# Patient Record
Sex: MALE | Race: WHITE | NOT HISPANIC OR LATINO | Employment: OTHER | URBAN - METROPOLITAN AREA
[De-identification: names, ages, dates, MRNs, and addresses within clinical notes are randomized per-mention and may not be internally consistent; named-entity substitution may affect disease eponyms.]

---

## 2022-07-05 ENCOUNTER — OFFICE VISIT (OUTPATIENT)
Dept: FAMILY MEDICINE CLINIC | Facility: CLINIC | Age: 62
End: 2022-07-05

## 2022-07-05 VITALS
DIASTOLIC BLOOD PRESSURE: 82 MMHG | OXYGEN SATURATION: 97 % | TEMPERATURE: 97.1 F | WEIGHT: 164 LBS | BODY MASS INDEX: 25.74 KG/M2 | HEART RATE: 85 BPM | HEIGHT: 67 IN | SYSTOLIC BLOOD PRESSURE: 120 MMHG

## 2022-07-05 DIAGNOSIS — Z71.85 IMMUNIZATION COUNSELING: ICD-10-CM

## 2022-07-05 DIAGNOSIS — Z11.59 NEED FOR HEPATITIS C SCREENING TEST: ICD-10-CM

## 2022-07-05 DIAGNOSIS — G89.29 CHRONIC PAIN OF BOTH SHOULDERS: ICD-10-CM

## 2022-07-05 DIAGNOSIS — M06.9 RHEUMATOID ARTHRITIS INVOLVING BOTH HANDS, UNSPECIFIED WHETHER RHEUMATOID FACTOR PRESENT: ICD-10-CM

## 2022-07-05 DIAGNOSIS — R93.89 ABNORMAL CHEST X-RAY: Primary | ICD-10-CM

## 2022-07-05 DIAGNOSIS — M79.642 PAIN IN BOTH HANDS: ICD-10-CM

## 2022-07-05 DIAGNOSIS — M25.512 CHRONIC PAIN OF BOTH SHOULDERS: ICD-10-CM

## 2022-07-05 DIAGNOSIS — I42.9 IDIOPATHIC CARDIOMYOPATHY: Primary | ICD-10-CM

## 2022-07-05 DIAGNOSIS — M25.511 CHRONIC PAIN OF BOTH SHOULDERS: ICD-10-CM

## 2022-07-05 DIAGNOSIS — R07.81 RIB PAIN: ICD-10-CM

## 2022-07-05 DIAGNOSIS — E78.5 HYPERLIPIDEMIA, UNSPECIFIED HYPERLIPIDEMIA TYPE: ICD-10-CM

## 2022-07-05 DIAGNOSIS — J44.9 CHRONIC OBSTRUCTIVE PULMONARY DISEASE, UNSPECIFIED COPD TYPE: ICD-10-CM

## 2022-07-05 DIAGNOSIS — Z13.220 SCREENING CHOLESTEROL LEVEL: ICD-10-CM

## 2022-07-05 DIAGNOSIS — K40.90 LEFT INGUINAL HERNIA: ICD-10-CM

## 2022-07-05 DIAGNOSIS — M79.641 PAIN IN BOTH HANDS: ICD-10-CM

## 2022-07-05 PROBLEM — I42.0 NONISCHEMIC CONGESTIVE CARDIOMYOPATHY: Status: ACTIVE | Noted: 2022-07-05

## 2022-07-05 PROBLEM — J30.9 ALLERGIC RHINITIS: Status: ACTIVE | Noted: 2022-07-05

## 2022-07-05 PROBLEM — M41.9 SCOLIOSIS DEFORMITY OF SPINE: Status: ACTIVE | Noted: 2022-07-05

## 2022-07-05 PROCEDURE — 99204 OFFICE O/P NEW MOD 45 MIN: CPT | Performed by: NURSE PRACTITIONER

## 2022-07-05 RX ORDER — CARVEDILOL 12.5 MG/1
12.5 TABLET ORAL 2 TIMES DAILY
COMMUNITY
End: 2022-07-05 | Stop reason: SDUPTHER

## 2022-07-05 RX ORDER — ALBUTEROL SULFATE 90 UG/1
2 AEROSOL, METERED RESPIRATORY (INHALATION) EVERY 4 HOURS PRN
COMMUNITY
End: 2022-07-05 | Stop reason: SDUPTHER

## 2022-07-05 RX ORDER — LISINOPRIL 40 MG/1
40 TABLET ORAL DAILY
Qty: 90 TABLET | Refills: 0 | Status: SHIPPED | OUTPATIENT
Start: 2022-07-05 | End: 2022-10-04 | Stop reason: SDUPTHER

## 2022-07-05 RX ORDER — ALBUTEROL SULFATE 90 UG/1
2 AEROSOL, METERED RESPIRATORY (INHALATION) EVERY 4 HOURS PRN
Qty: 18 G | Refills: 1 | Status: SHIPPED | OUTPATIENT
Start: 2022-07-05

## 2022-07-05 RX ORDER — LISINOPRIL 40 MG/1
40 TABLET ORAL DAILY
COMMUNITY
End: 2022-07-05 | Stop reason: SDUPTHER

## 2022-07-05 RX ORDER — AZITHROMYCIN 250 MG/1
TABLET, FILM COATED ORAL
Qty: 6 TABLET | Refills: 0 | Status: SHIPPED | OUTPATIENT
Start: 2022-07-05 | End: 2022-08-16

## 2022-07-05 RX ORDER — CHLORPHENIRAMINE MALEATE 4 MG/1
8 TABLET ORAL DAILY
COMMUNITY

## 2022-07-05 RX ORDER — CARVEDILOL 12.5 MG/1
12.5 TABLET ORAL 2 TIMES DAILY
Qty: 180 TABLET | Refills: 0 | Status: SHIPPED | OUTPATIENT
Start: 2022-07-05 | End: 2022-10-04 | Stop reason: SDUPTHER

## 2022-07-05 NOTE — PROGRESS NOTES
Chief Complaint  Hypertension (Refills)    PHQ-2 Total Score: 0    Subjective        Past Medical History:   Diagnosis Date   • Cardiomyopathy (HCC)    • Hypertension    • Renal anomaly      Social History     Tobacco Use   • Smoking status: Former Smoker     Packs/day: 1.00     Quit date:      Years since quittin.5   • Smokeless tobacco: Never Used   Vaping Use   • Vaping Use: Never used   Substance Use Topics   • Alcohol use: Yes     Comment: occasionally   • Drug use: Never      Current Outpatient Medications on File Prior to Visit   Medication Sig   • chlorpheniramine (Allergy Relief) 4 MG tablet Take 8 mg by mouth Daily.   • [DISCONTINUED] albuterol sulfate  (90 Base) MCG/ACT inhaler Inhale 2 puffs Every 4 (Four) Hours As Needed for Wheezing.   • [DISCONTINUED] carvedilol (COREG) 12.5 MG tablet Take 12.5 mg by mouth 2 (Two) Times a Day.   • [DISCONTINUED] lisinopril (PRINIVIL,ZESTRIL) 40 MG tablet Take 40 mg by mouth Daily.     No current facility-administered medications on file prior to visit.      Allergies   Allergen Reactions   • Penicillins Unknown - Low Severity      Health Maintenance Due   Topic Date Due   • COLORECTAL CANCER SCREENING  Never done   • ANNUAL PHYSICAL  Never done   • TDAP/TD VACCINES (1 - Tdap) Never done   • ZOSTER VACCINE (1 of 2) Never done   • COVID-19 Vaccine (4 - Booster for Pfizer series) 2022   • HEPATITIS C SCREENING  Never done   • LIPID PANEL  Never done      Harjinder Schneider presents to [facility]  Here as a new pt to establish care and obtain refills.     Idiopathic Cardiomyopathy: pt states his BP is usually normal. Denies lightheadedness, dizziness, CP, palpitations.     Pt had left inguinal hernia surgery on 2021. States that it is still there.     Rheumatoid arthritis: Notes especially in the hands with hx of joint replacements in the hands. Would like to follow up with Ortho, declines Rheumatology referral at  "this time.     Bilateral shoulder pain: pt states he has hx of torn rotator cuff and bone spurs bilaterally.     Bilateral rib pain with occasional sharp stabbing pain of the right middle ribs. Hx of pneumonia. Hx of coughing. Hx of fractured ribs without known injury.    Colonoscopy done about 4 years ago in [place], [place] at an outpatient surgical center.     No recent Tdap.     Pt has not received Shingles vaccine.       Objective   Vital Signs:   /82 (BP Location: Right arm)   Pulse 85   Temp 97.1 °F (36.2 °C)   Ht 168.9 cm (66.5\")   Wt 74.4 kg (164 lb)   SpO2 97%   BMI 26.07 kg/m²     Review of Systems   Physical Exam  Vitals reviewed.   Constitutional:       General: He is not in acute distress.     Appearance: Normal appearance. He is well-developed.   HENT:      Head: Normocephalic and atraumatic.   Eyes:      Conjunctiva/sclera: Conjunctivae normal.      Pupils: Pupils are equal, round, and reactive to light.   Cardiovascular:      Rate and Rhythm: Normal rate and regular rhythm.      Heart sounds: Normal heart sounds.   Pulmonary:      Effort: Pulmonary effort is normal.      Breath sounds: Normal breath sounds.   Musculoskeletal:      Cervical back: Neck supple.        Back:       Right lower leg: No edema.      Left lower leg: No edema.      Comments: Enlarged joints of bilateral hands.    Skin:     General: Skin is warm and dry.   Neurological:      Mental Status: He is alert and oriented to person, place, and time.   Psychiatric:         Mood and Affect: Mood and affect normal.         Behavior: Behavior normal.         Thought Content: Thought content normal.         Judgment: Judgment normal.        Result Review :                 Assessment and Plan    Diagnoses and all orders for this visit:    1. Idiopathic cardiomyopathy (HCC) (Primary)  -     Ambulatory Referral to Cardiology  -     CBC & Differential  -     Comprehensive Metabolic Panel  -     Lipid Panel  -     Urinalysis With " Culture If Indicated -  -     carvedilol (COREG) 12.5 MG tablet; Take 1 tablet by mouth 2 (Two) Times a Day.  Dispense: 180 tablet; Refill: 0  -     lisinopril (PRINIVIL,ZESTRIL) 40 MG tablet; Take 1 tablet by mouth Daily.  Dispense: 90 tablet; Refill: 0    2. Chronic obstructive pulmonary disease, unspecified COPD type (HCC)  -     albuterol sulfate  (90 Base) MCG/ACT inhaler; Inhale 2 puffs Every 4 (Four) Hours As Needed for Wheezing.  Dispense: 18 g; Refill: 1    3. Screening cholesterol level  -     Lipid Panel    4. Rheumatoid arthritis involving both hands, unspecified whether rheumatoid factor present (HCC)  -     Ambulatory Referral to Orthopedic Surgery  -     RHEUMATOID FACTOR    5. Left inguinal hernia  -     Ambulatory Referral to General Surgery    6. Pain in both hands    7. Chronic pain of both shoulders  -     Ambulatory Referral to Orthopedic Surgery    8. Hyperlipidemia, unspecified hyperlipidemia type  -     Lipid Panel    9. Rib pain  -     XR Chest PA & Lateral (In Office)    10. Immunization counseling  Comments:  Shefali Covington    11. Need for hepatitis C screening test  -     Hepatitis C Antibody        Follow Up   Return in about 3 months (around 10/5/2022) for Refills and fasting labs.  Patient was given instructions and counseling regarding his condition or for health maintenance advice. Please see specific information pulled into the AVS if appropriate.

## 2022-07-06 ENCOUNTER — CLINICAL SUPPORT (OUTPATIENT)
Dept: FAMILY MEDICINE CLINIC | Facility: CLINIC | Age: 62
End: 2022-07-06

## 2022-07-06 DIAGNOSIS — J30.9 ALLERGIC RHINITIS, UNSPECIFIED SEASONALITY, UNSPECIFIED TRIGGER: ICD-10-CM

## 2022-07-06 LAB
ALBUMIN SERPL-MCNC: 4.1 G/DL (ref 3.5–5.2)
ALBUMIN/GLOB SERPL: 2 G/DL
ALP SERPL-CCNC: 70 U/L (ref 39–117)
ALT SERPL W P-5'-P-CCNC: 10 U/L (ref 1–41)
ANION GAP SERPL CALCULATED.3IONS-SCNC: 8.2 MMOL/L (ref 5–15)
AST SERPL-CCNC: 5 U/L (ref 1–40)
BASOPHILS # BLD AUTO: 0.07 10*3/MM3 (ref 0–0.2)
BASOPHILS NFR BLD AUTO: 0.5 % (ref 0–1.5)
BILIRUB SERPL-MCNC: 0.3 MG/DL (ref 0–1.2)
BILIRUB UR QL STRIP: NEGATIVE
BUN SERPL-MCNC: 22 MG/DL (ref 8–23)
BUN/CREAT SERPL: 22 (ref 7–25)
CALCIUM SPEC-SCNC: 9 MG/DL (ref 8.6–10.5)
CHLORIDE SERPL-SCNC: 103 MMOL/L (ref 98–107)
CHOLEST SERPL-MCNC: 143 MG/DL (ref 0–200)
CHROMATIN AB SERPL-ACNC: 14.3 IU/ML (ref 0–14)
CLARITY UR: CLEAR
CO2 SERPL-SCNC: 25.8 MMOL/L (ref 22–29)
COLOR UR: YELLOW
CREAT SERPL-MCNC: 1 MG/DL (ref 0.76–1.27)
DEPRECATED RDW RBC AUTO: 46.3 FL (ref 37–54)
EGFRCR SERPLBLD CKD-EPI 2021: 85.6 ML/MIN/1.73
EOSINOPHIL # BLD AUTO: 0 10*3/MM3 (ref 0–0.4)
EOSINOPHIL NFR BLD AUTO: 0 % (ref 0.3–6.2)
ERYTHROCYTE [DISTWIDTH] IN BLOOD BY AUTOMATED COUNT: 13.1 % (ref 12.3–15.4)
GLOBULIN UR ELPH-MCNC: 2.1 GM/DL
GLUCOSE SERPL-MCNC: 91 MG/DL (ref 65–99)
GLUCOSE UR STRIP-MCNC: NEGATIVE MG/DL
HCT VFR BLD AUTO: 44.9 % (ref 37.5–51)
HCV AB SER DONR QL: NORMAL
HDLC SERPL-MCNC: 52 MG/DL (ref 40–60)
HGB BLD-MCNC: 15 G/DL (ref 13–17.7)
HGB UR QL STRIP.AUTO: NEGATIVE
IMM GRANULOCYTES # BLD AUTO: 0.23 10*3/MM3 (ref 0–0.05)
IMM GRANULOCYTES NFR BLD AUTO: 1.7 % (ref 0–0.5)
KETONES UR QL STRIP: NEGATIVE
LDLC SERPL CALC-MCNC: 79 MG/DL (ref 0–100)
LDLC/HDLC SERPL: 1.54 {RATIO}
LEUKOCYTE ESTERASE UR QL STRIP.AUTO: NEGATIVE
LYMPHOCYTES # BLD AUTO: 2.7 10*3/MM3 (ref 0.7–3.1)
LYMPHOCYTES NFR BLD AUTO: 20.1 % (ref 19.6–45.3)
MCH RBC QN AUTO: 32.1 PG (ref 26.6–33)
MCHC RBC AUTO-ENTMCNC: 33.4 G/DL (ref 31.5–35.7)
MCV RBC AUTO: 95.9 FL (ref 79–97)
MONOCYTES # BLD AUTO: 0.68 10*3/MM3 (ref 0.1–0.9)
MONOCYTES NFR BLD AUTO: 5.1 % (ref 5–12)
NEUTROPHILS NFR BLD AUTO: 72.6 % (ref 42.7–76)
NEUTROPHILS NFR BLD AUTO: 9.72 10*3/MM3 (ref 1.7–7)
NITRITE UR QL STRIP: NEGATIVE
NRBC BLD AUTO-RTO: 0 /100 WBC (ref 0–0.2)
PH UR STRIP.AUTO: 6 [PH] (ref 5–8)
PLATELET # BLD AUTO: 230 10*3/MM3 (ref 140–450)
PMV BLD AUTO: 10.4 FL (ref 6–12)
POTASSIUM SERPL-SCNC: 4.7 MMOL/L (ref 3.5–5.2)
PROT SERPL-MCNC: 6.2 G/DL (ref 6–8.5)
PROT UR QL STRIP: NEGATIVE
RBC # BLD AUTO: 4.68 10*6/MM3 (ref 4.14–5.8)
SODIUM SERPL-SCNC: 137 MMOL/L (ref 136–145)
SP GR UR STRIP: 1.02 (ref 1–1.03)
TRIGL SERPL-MCNC: 54 MG/DL (ref 0–150)
UROBILINOGEN UR QL STRIP: NORMAL
VLDLC SERPL-MCNC: 12 MG/DL (ref 5–40)
WBC NRBC COR # BLD: 13.4 10*3/MM3 (ref 3.4–10.8)

## 2022-07-06 PROCEDURE — 80053 COMPREHEN METABOLIC PANEL: CPT | Performed by: NURSE PRACTITIONER

## 2022-07-06 PROCEDURE — 85025 COMPLETE CBC W/AUTO DIFF WBC: CPT | Performed by: NURSE PRACTITIONER

## 2022-07-06 PROCEDURE — 81003 URINALYSIS AUTO W/O SCOPE: CPT | Performed by: NURSE PRACTITIONER

## 2022-07-06 PROCEDURE — 86803 HEPATITIS C AB TEST: CPT | Performed by: NURSE PRACTITIONER

## 2022-07-06 PROCEDURE — 36415 COLL VENOUS BLD VENIPUNCTURE: CPT | Performed by: NURSE PRACTITIONER

## 2022-07-06 PROCEDURE — 86431 RHEUMATOID FACTOR QUANT: CPT | Performed by: NURSE PRACTITIONER

## 2022-07-06 PROCEDURE — 80061 LIPID PANEL: CPT | Performed by: NURSE PRACTITIONER

## 2022-07-06 NOTE — PROGRESS NOTES
Venipuncture Blood Specimen Collection  Venipuncture performed in left arm  by Brinda Velez with good hemostasis. Patient tolerated the procedure well without complications.   07/06/22   Brinda Velez

## 2022-07-19 ENCOUNTER — OFFICE VISIT (OUTPATIENT)
Dept: FAMILY MEDICINE CLINIC | Facility: CLINIC | Age: 62
End: 2022-07-19

## 2022-07-19 VITALS
WEIGHT: 164 LBS | DIASTOLIC BLOOD PRESSURE: 82 MMHG | OXYGEN SATURATION: 98 % | BODY MASS INDEX: 25.74 KG/M2 | HEIGHT: 67 IN | HEART RATE: 71 BPM | TEMPERATURE: 97.7 F | SYSTOLIC BLOOD PRESSURE: 120 MMHG

## 2022-07-19 DIAGNOSIS — R93.89 ABNORMAL CHEST X-RAY: ICD-10-CM

## 2022-07-19 DIAGNOSIS — D72.829 LEUKOCYTOSIS, UNSPECIFIED TYPE: Primary | ICD-10-CM

## 2022-07-19 LAB
BASOPHILS # BLD AUTO: 0.04 10*3/MM3 (ref 0–0.2)
BASOPHILS NFR BLD AUTO: 0.4 % (ref 0–1.5)
DEPRECATED RDW RBC AUTO: 45.9 FL (ref 37–54)
EOSINOPHIL # BLD AUTO: 0 10*3/MM3 (ref 0–0.4)
EOSINOPHIL NFR BLD AUTO: 0 % (ref 0.3–6.2)
ERYTHROCYTE [DISTWIDTH] IN BLOOD BY AUTOMATED COUNT: 13.1 % (ref 12.3–15.4)
HCT VFR BLD AUTO: 43 % (ref 37.5–51)
HGB BLD-MCNC: 14.4 G/DL (ref 13–17.7)
IMM GRANULOCYTES # BLD AUTO: 0.09 10*3/MM3 (ref 0–0.05)
IMM GRANULOCYTES NFR BLD AUTO: 0.9 % (ref 0–0.5)
LYMPHOCYTES # BLD AUTO: 2.2 10*3/MM3 (ref 0.7–3.1)
LYMPHOCYTES NFR BLD AUTO: 21.3 % (ref 19.6–45.3)
MCH RBC QN AUTO: 32 PG (ref 26.6–33)
MCHC RBC AUTO-ENTMCNC: 33.5 G/DL (ref 31.5–35.7)
MCV RBC AUTO: 95.6 FL (ref 79–97)
MONOCYTES # BLD AUTO: 0.55 10*3/MM3 (ref 0.1–0.9)
MONOCYTES NFR BLD AUTO: 5.3 % (ref 5–12)
NEUTROPHILS NFR BLD AUTO: 7.43 10*3/MM3 (ref 1.7–7)
NEUTROPHILS NFR BLD AUTO: 72.1 % (ref 42.7–76)
NRBC BLD AUTO-RTO: 0 /100 WBC (ref 0–0.2)
PLATELET # BLD AUTO: 221 10*3/MM3 (ref 140–450)
PMV BLD AUTO: 10.4 FL (ref 6–12)
RBC # BLD AUTO: 4.5 10*6/MM3 (ref 4.14–5.8)
WBC NRBC COR # BLD: 10.31 10*3/MM3 (ref 3.4–10.8)

## 2022-07-19 PROCEDURE — 85025 COMPLETE CBC W/AUTO DIFF WBC: CPT | Performed by: NURSE PRACTITIONER

## 2022-07-19 PROCEDURE — 99213 OFFICE O/P EST LOW 20 MIN: CPT | Performed by: NURSE PRACTITIONER

## 2022-07-19 RX ORDER — ASPIRIN 81 MG/1
81 TABLET ORAL DAILY
Status: ON HOLD | COMMUNITY
End: 2022-12-01

## 2022-07-19 NOTE — PROGRESS NOTES
Venipuncture Blood Specimen Collection  Venipuncture performed in left arm by Zenobia Collins with good hemostasis. Patient tolerated the procedure well without complications.   07/19/22   Zenobia Collins

## 2022-07-19 NOTE — PROGRESS NOTES
Chief Complaint  Abnormal Chest X-ray (Follow up to chest xray and labs)    Subjective        Past Medical History:   Diagnosis Date   • Cardiomyopathy (HCC)    • Hypertension    • Renal anomaly      Social History     Tobacco Use   • Smoking status: Former Smoker     Packs/day: 1.00     Quit date:      Years since quittin.5   • Smokeless tobacco: Never Used   Vaping Use   • Vaping Use: Never used   Substance Use Topics   • Alcohol use: Yes     Comment: occasionally   • Drug use: Never      Current Outpatient Medications on File Prior to Visit   Medication Sig   • albuterol sulfate  (90 Base) MCG/ACT inhaler Inhale 2 puffs Every 4 (Four) Hours As Needed for Wheezing.   • aspirin 81 MG EC tablet Take 81 mg by mouth Daily.   • carvedilol (COREG) 12.5 MG tablet Take 1 tablet by mouth 2 (Two) Times a Day.   • chlorpheniramine (CHLOR-TRIMETON) 4 MG tablet Take 8 mg by mouth Daily.   • lisinopril (PRINIVIL,ZESTRIL) 40 MG tablet Take 1 tablet by mouth Daily.   • azithromycin (Zithromax Z-Michael) 250 MG tablet Take 2 tablets by mouth on day 1, then 1 tablet daily on days 2-5     No current facility-administered medications on file prior to visit.      Allergies   Allergen Reactions   • Penicillins Unknown - Low Severity      Health Maintenance Due   Topic Date Due   • COLORECTAL CANCER SCREENING  Never done   • Pneumococcal Vaccine 0-64 (1 - PCV) Never done   • TDAP/TD VACCINES (1 - Tdap) Never done   • ZOSTER VACCINE (1 of 2) Never done   • COVID-19 Vaccine (4 - Booster for Pfizer series) 2022   • ANNUAL WELLNESS VISIT  Never done      Harjinder Schneider presents to [facility]  Here to follow up on abnormal x-ray that showed possible developing left lower lobe pneumonia. Pts CBC also showed elevated WBC.     Pt has an appointment tomorrow with general surgeon for hernia consult, left inguinal hernia. Pt states he will feel air and gas through the area. He can feel it pushing  "back in when he leans over something.     Pt had elevated RF but declines referral to Rheumatology.     Pt notes worsening shoulder pain, awaiting referral to Ortho. States it is causing difficulty sleeping.       Objective   Vital Signs:   /82 (BP Location: Left arm)   Pulse 71   Temp 97.7 °F (36.5 °C)   Ht 168.9 cm (66.5\")   Wt 74.4 kg (164 lb)   SpO2 98%   BMI 26.07 kg/m²     Review of Systems   Physical Exam  Vitals reviewed.   Constitutional:       General: He is not in acute distress.     Appearance: Normal appearance. He is well-developed.   Eyes:      Conjunctiva/sclera: Conjunctivae normal.      Pupils: Pupils are equal, round, and reactive to light.   Cardiovascular:      Rate and Rhythm: Normal rate and regular rhythm.      Heart sounds: Normal heart sounds.   Pulmonary:      Effort: Pulmonary effort is normal.      Breath sounds: Normal breath sounds.   Musculoskeletal:      Cervical back: Neck supple.   Skin:     General: Skin is warm and dry.   Neurological:      Mental Status: He is alert and oriented to person, place, and time.   Psychiatric:         Mood and Affect: Mood and affect normal.         Behavior: Behavior normal.         Thought Content: Thought content normal.         Judgment: Judgment normal.        Result Review :   The following data was reviewed by: TROY Richard on 07/19/2022:  Common labs    Common Labsle 7/6/22 7/6/22 7/6/22    1023 1023 1023   Glucose  91    BUN  22    Creatinine  1.00    Sodium  137    Potassium  4.7    Chloride  103    Calcium  9.0    Albumin  4.10    Total Bilirubin  0.3    Alkaline Phosphatase  70    AST (SGOT)  5    ALT (SGPT)  10    WBC 13.40 (A)     Hemoglobin 15.0     Hematocrit 44.9     Platelets 230     Total Cholesterol   143   Triglycerides   54   HDL Cholesterol   52   LDL Cholesterol    79   (A) Abnormal value            Data reviewed: Radiologic studies CXR          Assessment and Plan    Diagnoses and all orders for this " visit:    1. Leukocytosis, unspecified type (Primary)  -     CBC & Differential    2. Abnormal chest x-ray  -     XR Chest PA & Lateral (In Office)        Follow Up   Return if symptoms worsen or fail to improve.  Patient was given instructions and counseling regarding his condition or for health maintenance advice. Please see specific information pulled into the AVS if appropriate.

## 2022-07-20 ENCOUNTER — OFFICE VISIT (OUTPATIENT)
Dept: SURGERY | Facility: CLINIC | Age: 62
End: 2022-07-20

## 2022-07-20 ENCOUNTER — PREP FOR SURGERY (OUTPATIENT)
Dept: OTHER | Facility: HOSPITAL | Age: 62
End: 2022-07-20

## 2022-07-20 VITALS — HEIGHT: 66 IN | WEIGHT: 164 LBS | BODY MASS INDEX: 26.36 KG/M2 | RESPIRATION RATE: 16 BRPM

## 2022-07-20 DIAGNOSIS — K40.90 LEFT INGUINAL HERNIA: Primary | ICD-10-CM

## 2022-07-20 PROCEDURE — 99204 OFFICE O/P NEW MOD 45 MIN: CPT | Performed by: SURGERY

## 2022-07-20 RX ORDER — CEFAZOLIN SODIUM 2 G/100ML
2 INJECTION, SOLUTION INTRAVENOUS ONCE
Status: CANCELLED | OUTPATIENT
Start: 2022-07-20 | End: 2022-07-20

## 2022-07-20 RX ORDER — SODIUM CHLORIDE 0.9 % (FLUSH) 0.9 %
10 SYRINGE (ML) INJECTION EVERY 12 HOURS SCHEDULED
Status: CANCELLED | OUTPATIENT
Start: 2022-07-20

## 2022-07-20 RX ORDER — SODIUM CHLORIDE 9 MG/ML
40 INJECTION, SOLUTION INTRAVENOUS AS NEEDED
Status: CANCELLED | OUTPATIENT
Start: 2022-07-20

## 2022-07-20 RX ORDER — SODIUM CHLORIDE, SODIUM LACTATE, POTASSIUM CHLORIDE, CALCIUM CHLORIDE 600; 310; 30; 20 MG/100ML; MG/100ML; MG/100ML; MG/100ML
100 INJECTION, SOLUTION INTRAVENOUS CONTINUOUS
Status: CANCELLED | OUTPATIENT
Start: 2022-07-20

## 2022-07-20 RX ORDER — SODIUM CHLORIDE 0.9 % (FLUSH) 0.9 %
10 SYRINGE (ML) INJECTION AS NEEDED
Status: CANCELLED | OUTPATIENT
Start: 2022-07-20

## 2022-07-20 NOTE — PROGRESS NOTES
"Chief Complaint: Hernia (Left inguinal )    Subjective       History of Present Illness  Harjinder Schneider is a 61 y.o. male presents to [facility] to be seen  for an inguinal hernia.    Harjidner Schneider, an adult male, is a new patient and is being seen for an inguinal hernia. He is accompanied by an adult female.    Left inguinal hernia  The patient reports that he has a hernia on his left groin that has been present for 56 years. He states that when he was 6 years old, he had a double inguinal hernia that was repaired until last year. He reports that it is just the left side, and the right is okay. The left is not ok because it was smashed. He reports that it feels like there are \"old wire pieces in there\". He states that he has been awakened in the night at different times feeling like someone stabbed him in the abdomen. He reports that he previously got the flu, and states when he would cough, it felt like something popping back out again. He reports that he had an open hernia repair last year. He states they put in a piece of mesh to try and fix it. He reports that it lasted approximately 6 months. When he blows his nose, it feels like a knife coming from the inside. He states he has a lot of tenderness in his abdomen. He reports that he has not had a problem having a bowel movement. He states since he had this surgery, he feels like he has to urinate, but he cannot get up until he gets unpinched. He reports that a lot of times it feels like he has to go over to the neighbor's house first and then come back. He states that sometimes he will sit on the toilet for 10 minutes before he is finally able to urinate. He reports he only has one kidney that works. He states that the one on the left side does not work. He reports that he also has thin skin as well. The adult female states that the patient's mother has had several hernia repairs. She had the robotic surgery once and it nicked an " "artery. The patient states that he does not think doing another CT scan is necessary. The adult female reports the patient goes to see his cardiologist next month.    Objective     Past Medical History:   Diagnosis Date   • Cardiomyopathy (HCC)    • Hypertension    • Renal anomaly        Past Surgical History:   Procedure Laterality Date   • HERNIA REPAIR     • ROTATOR CUFF REPAIR Bilateral          Current Outpatient Medications:   •  albuterol sulfate  (90 Base) MCG/ACT inhaler, Inhale 2 puffs Every 4 (Four) Hours As Needed for Wheezing., Disp: 18 g, Rfl: 1  •  aspirin 81 MG EC tablet, Take 81 mg by mouth Daily., Disp: , Rfl:   •  carvedilol (COREG) 12.5 MG tablet, Take 1 tablet by mouth 2 (Two) Times a Day., Disp: 180 tablet, Rfl: 0  •  chlorpheniramine (CHLOR-TRIMETON) 4 MG tablet, Take 8 mg by mouth Daily., Disp: , Rfl:   •  lisinopril (PRINIVIL,ZESTRIL) 40 MG tablet, Take 1 tablet by mouth Daily., Disp: 90 tablet, Rfl: 0  •  azithromycin (Zithromax Z-Michael) 250 MG tablet, Take 2 tablets by mouth on day 1, then 1 tablet daily on days 2-5, Disp: 6 tablet, Rfl: 0    Allergies   Allergen Reactions   • Penicillins Unknown - Low Severity        Family History   Problem Relation Age of Onset   • Arthritis Mother    • Thyroid disease Mother    • Stroke Mother    • Hypertension Mother    • Heart disease Father    • Crohn's disease Father        Social History     Socioeconomic History   • Marital status:    Tobacco Use   • Smoking status: Former Smoker     Packs/day: 1.00     Quit date:      Years since quittin.5   • Smokeless tobacco: Never Used   Vaping Use   • Vaping Use: Never used   Substance and Sexual Activity   • Alcohol use: Yes     Comment: occasionally   • Drug use: Never       Vital Signs:   Resp 16   Ht 167.6 cm (66\")   Wt 74.4 kg (164 lb)   BMI 26.47 kg/m²      Physical Exam  Constitutional:       Appearance: Normal appearance. He is well-developed and normal weight.      Comments: " No acute distress.    Cardiovascular:      Heart sounds: No murmur heard.  Pulmonary:      Effort: Pulmonary effort is normal.      Breath sounds: Normal air entry.      Comments: No acute distress.   Abdominal:      Comments: Abdomen is soft. He has a clear partial reducible hernia on the left groin. He is tender there. There are no overlying skin changes, but it is enlarging by his estimation.   Neurological:      Mental Status: He is alert and oriented to person, place, and time.      Motor: Motor function is intact.          Result Review :         Procedures        Assessment and Plan   There are no diagnoses linked to this encounter.    Assessment and Plan   Diagnoses and all orders for this visit:      1. The patient has a symptomatic recurrent left inguinal hernia. He had it repaired previously somewhere in [place].     The patient will have robotic inguinal hernia repair. The risks, benefits, and alternatives of the procedure were discussed extensively. All questions were answered. The patient voiced understanding and agreed to proceed with the above plan.      Follow Up   No follow-ups on file.  Patient was given instructions and counseling regarding his condition or for health maintenance advice. Please see specific information pulled into the AVS if appropriate.     Discussed with the patient - all questions were answered they voiced understanding and agreed to proceed with above plan       Transcribed from ambient dictation for Josh Aguilar MD by Yumi Moran.  07/20/22   15:05 EDT    Patient verbalized consent to the visit recording.

## 2022-07-25 ENCOUNTER — TELEPHONE (OUTPATIENT)
Dept: ORTHOPEDIC SURGERY | Facility: CLINIC | Age: 62
End: 2022-07-25

## 2022-07-25 NOTE — TELEPHONE ENCOUNTER
CHRONIC BILATERAL SHOULDER PAIN, HE STATES HE HAD RTC SURGERY APPROXIMATELY 6 YEARS AGO ON BOTH SHOULDERS, BUT UNABLE TO OBTAIN RECORDS.

## 2022-08-05 ENCOUNTER — OFFICE VISIT (OUTPATIENT)
Dept: ORTHOPEDIC SURGERY | Facility: CLINIC | Age: 62
End: 2022-08-05

## 2022-08-05 VITALS — HEART RATE: 70 BPM | BODY MASS INDEX: 26.36 KG/M2 | OXYGEN SATURATION: 98 % | HEIGHT: 66 IN | WEIGHT: 164 LBS

## 2022-08-05 DIAGNOSIS — M25.511 BILATERAL SHOULDER PAIN, UNSPECIFIED CHRONICITY: Primary | ICD-10-CM

## 2022-08-05 DIAGNOSIS — M25.512 BILATERAL SHOULDER PAIN, UNSPECIFIED CHRONICITY: Primary | ICD-10-CM

## 2022-08-05 DIAGNOSIS — M12.811 ROTATOR CUFF ARTHROPATHY OF BOTH SHOULDERS: ICD-10-CM

## 2022-08-05 DIAGNOSIS — M12.812 ROTATOR CUFF ARTHROPATHY OF BOTH SHOULDERS: ICD-10-CM

## 2022-08-05 PROCEDURE — 99203 OFFICE O/P NEW LOW 30 MIN: CPT | Performed by: STUDENT IN AN ORGANIZED HEALTH CARE EDUCATION/TRAINING PROGRAM

## 2022-08-05 NOTE — PROGRESS NOTES
"Chief Complaint  Pain of the Right Shoulder and Pain of the Left Shoulder    Subjective          Harjinder Schneider presents to [facility] for   History of Present Illness    Harjinder presents today for evaluation of his bilateral shoulders.  He has a greater than 10-year history of bilateral shoulder pain.  He is a rheumatoid patient.  He has had bilateral shoulder surgery in the past, approximately 8 years ago.  He had attempted rotator cuff repairs done bilaterally.  He reports mild to moderate improvement after the shoulder surgeries.  His pain is now returning.  He has pain at night.  He has pain at rest.  He has pain with activity.  He has pain with reaching and lifting.  Shoulders feel weak bilaterally.  He denies any radiating pain or numbness.  The pain localizes to the lateral shoulders.  He is right-hand dominant.  His shoulders are equal in severity subjectively.    Allergies   Allergen Reactions   • Penicillins Unknown - Low Severity        Social History     Socioeconomic History   • Marital status:    Tobacco Use   • Smoking status: Former Smoker     Packs/day: 1.00     Quit date:      Years since quittin.6   • Smokeless tobacco: Never Used   Vaping Use   • Vaping Use: Never used   Substance and Sexual Activity   • Alcohol use: Yes     Comment: occasionally   • Drug use: Never        I reviewed the patient's chief complaint, history of present illness, review of systems, past medical history, surgical history, family history, social history, medications, and allergy list.     REVIEW OF SYSTEMS    Constitutional: Denies fevers, chills, weight loss  Cardiovascular: Denies chest pain, shortness of breath  Skin: Denies rashes, acute skin changes  Neurologic: Denies headache, loss of consciousness  MSK: Bilateral shoulder pain      Objective   Vital Signs:   Pulse 70   Ht 167.6 cm (66\")   Wt 74.4 kg (164 lb)   SpO2 98%   BMI 26.47 kg/m²     Body mass index is 26.47 " kg/m².    Physical Exam    General: Alert. No acute distress.   Right upper extremity: No areas of tenderness.  Active elevation 180 degrees, external rotation to 45 degrees, internal rotation to the lower lumbar spine.  4- out of 5 with supraspinatus and infraspinatus testing.  5 out of 5 subscapularis testing.  No mechanical symptoms with motion.  Pain with speeds testing.  No pain with crossarm adduction.  Distal neurovascular intact.    Left upper extremity: No areas of tenderness.  Active elevation 180 degrees.  External rotation 45 degrees.  Internal rotation of the lower lumbar spine.  4-5 with supraspinatus and infraspinatus testing.  5 out of 5 subscapularis testing.  Slight crepitus with motion.  Pain with speeds testing.  No pain with crossarm adduction.  Distal neurovascular intact.    Procedures    Imaging Results (Most Recent)     Procedure Component Value Units Date/Time    XR Shoulder 2+ View Left [[id number]] Resulted: 08/05/22 0953     Updated: 08/05/22 0954    Narrative:      Indications: Left shoulder pain    Views: AP, Grashey, Scap Y, axillary left shoulder    Findings: Arthritic changes are seen in the glenohumeral joint.  The   humeral head is slightly high riding.  Mild AC joint arthritic changes   noted.  There appears to be cystic changes within the glenoid.  An   inferior osteophyte is present on the humeral head.  Glenohumeral joint   reduced.    Comparative Data: No comparative data available      XR Shoulder 2+ View Right [[id number]] Resulted: 08/05/22 0952     Updated: 08/05/22 0953    Narrative:      Indications: Right shoulder pain    Views: AP, Grashey, Scap Y, axillary right shoulder    Findings: No fractures noted.  Glenohumeral joint reduced.  Moderate AC   joint arthritis.  No glenohumeral arthritic changes appreciated.  The   humeral head may be slightly high riding on the AP view.    Comparative Data: No comparative data available                     Assessment and Plan         XR Shoulder 2+ View Left    Result Date: 8/5/2022  Narrative: Indications: Left shoulder pain Views: AP, Grashey, Scap Y, axillary left shoulder Findings: Arthritic changes are seen in the glenohumeral joint.  The humeral head is slightly high riding.  Mild AC joint arthritic changes noted.  There appears to be cystic changes within the glenoid.  An inferior osteophyte is present on the humeral head.  Glenohumeral joint reduced. Comparative Data: No comparative data available     XR Shoulder 2+ View Right    Result Date: 8/5/2022  Narrative: Indications: Right shoulder pain Views: AP, Grashey, Scap Y, axillary right shoulder Findings: No fractures noted.  Glenohumeral joint reduced.  Moderate AC joint arthritis.  No glenohumeral arthritic changes appreciated.  The humeral head may be slightly high riding on the AP view. Comparative Data: No comparative data available     XR Chest PA & Lateral (In Office)    Result Date: 7/19/2022  Narrative: PROCEDURE: XR CHEST PA AND LATERAL  COMPARISON: [facility] , XR CHEST PA AND LATERAL, 7/05/2022, 12:00.  INDICATIONS: possible infiltrates on x-ray 14 days ago  FINDINGS:  The heart is enlarged.  There is tortuosity of the thoracic aorta.  There is stable scar in the lingular segment of the left upper lobe.  Scoliosis is again identified.  There are no focal consolidations to suggest pneumonia.      Impression:  No active disease     JONATHAN TAN MD       Electronically Signed and Approved By: JONATHAN TAN MD on 7/19/2022 at 11:56                Diagnoses and all orders for this visit:    1. Bilateral shoulder pain, unspecified chronicity (Primary)  -     XR Shoulder 2+ View Right  -     XR Shoulder 2+ View Left    2. Rotator cuff arthropathy of both shoulders        Still has bilateral shoulder pain.  His x-rays revealed left rotator cuff arthropathy.  I am concerned for a rotator cuff repair on the right and his humeral head does appear slightly high  riding consistent with early rotator cuff arthropathy here as well.  He is relatively new to the area.  He is getting established with a cardiologist for a history of idiopathic cardiomyopathy.  He also has upcoming abdominal hernia surgery planned.  We discussed treatment options for his shoulders.  We discussed anti-inflammatory medications, but he only has 1 functioning kidney.  We discussed injections but he declined.  He has had multiple hand surgeries in the past and has not done well with steroid injections previously.  We will provide him home exercises for the bilateral shoulders today.  He will work through his abdominal surgery and follow-up with me after he is cleared per his general surgeon.  We may consider reverse total shoulder arthroplasty in the future.       Call or return if worsening symptoms.    Scribed for Vadim Rooney MD by Vadim Rooney MD  08/05/2022   09:55 EDT         Follow Up   Return if symptoms worsen or fail to improve.  Patient was given instructions and counseling regarding his condition or for health maintenance advice. Please see specific information pulled into the AVS if appropriate.       I have personally performed the services described in this document as scribed by the above individual and it is both accurate and complete.     Vadim Rooney MD  08/05/22  09:56 EDT

## 2022-08-16 ENCOUNTER — OFFICE VISIT (OUTPATIENT)
Dept: CARDIOLOGY | Facility: CLINIC | Age: 62
End: 2022-08-16

## 2022-08-16 VITALS
SYSTOLIC BLOOD PRESSURE: 152 MMHG | HEIGHT: 66 IN | WEIGHT: 167.4 LBS | DIASTOLIC BLOOD PRESSURE: 96 MMHG | HEART RATE: 57 BPM | BODY MASS INDEX: 26.9 KG/M2

## 2022-08-16 DIAGNOSIS — I50.41 ACUTE COMBINED SYSTOLIC (CONGESTIVE) AND DIASTOLIC (CONGESTIVE) HEART FAILURE: ICD-10-CM

## 2022-08-16 DIAGNOSIS — Z01.818 PREOPERATIVE CLEARANCE: ICD-10-CM

## 2022-08-16 DIAGNOSIS — I42.9 CARDIOMYOPATHY, UNSPECIFIED TYPE: Primary | ICD-10-CM

## 2022-08-16 DIAGNOSIS — I10 PRIMARY HYPERTENSION: ICD-10-CM

## 2022-08-16 PROCEDURE — 93000 ELECTROCARDIOGRAM COMPLETE: CPT | Performed by: INTERNAL MEDICINE

## 2022-08-16 PROCEDURE — 99204 OFFICE O/P NEW MOD 45 MIN: CPT | Performed by: INTERNAL MEDICINE

## 2022-08-16 NOTE — PROGRESS NOTES
Chief Complaint  Establish Care, Shortness of Breath, and Hypertension    Subjective            Harjinder Schneider presents to [facility] CARDIOLOGY  History of present illness:    Patient is a 61-year-old white male with longstanding idiopathic cardiomyopathy diagnosed in .  He also has a history of 1 functioning kidney.  He has lived in several states and has seen cardiologist.  He states he has had a couple left heart caths that required no PCI.  He was told that he had a virus that affected the heart.  He has been on the carvedilol and lisinopril since .  It does not sound like anybody talk to him directly about an AICD.  He notes no chest pain, edema or orthopnea.  He does have to go under a hernia surgery and possible bilateral shoulder replacement surgery.  He is fairly active.  He has remote smoking history before quitting 12 to 15 years ago.  He notes occasional alcohol.  His father had a pacemaker.  He does not remember when the last echocardiogram was done.      Past Medical History:   Diagnosis Date   • Cardiomyopathy (HCC)    • Hypertension    • Renal anomaly            Past Surgical History:   Procedure Laterality Date   • HERNIA REPAIR     • ROTATOR CUFF REPAIR Bilateral           Social History     Socioeconomic History   • Marital status:    Tobacco Use   • Smoking status: Former Smoker     Packs/day: 1.00     Quit date:      Years since quittin.6   • Smokeless tobacco: Never Used   Vaping Use   • Vaping Use: Never used   Substance and Sexual Activity   • Alcohol use: Yes     Comment: occasionally   • Drug use: Never           Family History   Problem Relation Age of Onset   • Arthritis Mother    • Thyroid disease Mother    • Stroke Mother    • Hypertension Mother    • Heart disease Father    • Crohn's disease Father             Allergies   Allergen Reactions   • Penicillins Unknown - Low Severity            Current Outpatient Medications:   •  albuterol sulfate  " (90 Base) MCG/ACT inhaler, Inhale 2 puffs Every 4 (Four) Hours As Needed for Wheezing., Disp: 18 g, Rfl: 1  •  aspirin 81 MG EC tablet, Take 81 mg by mouth Daily., Disp: , Rfl:   •  carvedilol (COREG) 12.5 MG tablet, Take 1 tablet by mouth 2 (Two) Times a Day., Disp: 180 tablet, Rfl: 0  •  chlorpheniramine (CHLOR-TRIMETON) 4 MG tablet, Take 8 mg by mouth Daily., Disp: , Rfl:   •  lisinopril (PRINIVIL,ZESTRIL) 40 MG tablet, Take 1 tablet by mouth Daily., Disp: 90 tablet, Rfl: 0      ROS:  Cardiac review of systems negative    Objective     /96   Pulse 57   Ht 167.6 cm (66\")   Wt 75.9 kg (167 lb 6.4 oz)   BMI 27.02 kg/m²       General Appearance:   · well developed  · well nourished  HENT:   · oropharynx moist  · lips not cyanotic  Respiratory:  · no respiratory distress  · normal breath sounds  · no rales  Cardiovascular:  · no jugular venous distention  · regular rhythm  · S1 normal, S2 normal  · no S3, no S4   · no murmur  · no rub, no thrill  · No carotid bruit  · pedal pulses normal  · lower extremity edema: none    Musculoskeletal:  · no clubbing of fingers.   · normocephalic, head atraumatic  Skin:   · warm, dry  Psychiatric:  · judgement and insight appropriate  · normal mood and affect            ECG 12 Lead    Date/Time: 8/16/2022 9:31 AM  Performed by: David Guerrero MD  Authorized by: David Guerrero MD   Previous ECG: no previous ECG available  Rhythm: sinus rhythm  Comments: Ectopic beats either PVCs or aberrant PACs.  Poor R wave progression.  Left axis deviation.                              ASSESSMENT:  Encounter Diagnoses   Name Primary?   • Cardiomyopathy, unspecified type (HCC) Yes   • Primary hypertension    • Acute combined systolic (congestive) and diastolic (congestive) heart failure (HCC)     • Preoperative clearance          PLAN:    1.  We will get an echocardiogram to determine his ejection fraction.  I do think after the echocardiogram he can be cleared " from a cardiac standpoint for surgery.  It sounds like his EF has been above 35% in the past as no one has specifically talked to him about an AICD.  2.  Continue the Coreg and lisinopril.  Patient states his blood pressure normally runs under great control and the last time it was checked it was 120/82.  3.  We will get an EKG for baseline also due to the fact that I hear ectopic beats today EKG today shows normal sinus rhythm with poor R wave progression.  There is left axis deviation.  He did have some ectopic beats that appear to be PVCs or aberrant PACs.  He does not feel these..  4.  We will continue to follow closely.          Patient was given instructions and counseling regarding his condition or for health maintenance advice. Please see specific information pulled into the AVS if appropriate.         David Guerrero MD   8/16/2022  09:21 EDT

## 2022-08-22 ENCOUNTER — HOSPITAL ENCOUNTER (OUTPATIENT)
Dept: CARDIOLOGY | Facility: HOSPITAL | Age: 62
Discharge: HOME | End: 2022-08-22
Admitting: INTERNAL MEDICINE

## 2022-08-22 DIAGNOSIS — I50.41 ACUTE COMBINED SYSTOLIC (CONGESTIVE) AND DIASTOLIC (CONGESTIVE) HEART FAILURE: ICD-10-CM

## 2022-08-22 DIAGNOSIS — I42.9 CARDIOMYOPATHY, UNSPECIFIED TYPE: ICD-10-CM

## 2022-08-22 PROCEDURE — 93306 TTE W/DOPPLER COMPLETE: CPT

## 2022-08-22 PROCEDURE — 93306 TTE W/DOPPLER COMPLETE: CPT | Performed by: INTERNAL MEDICINE

## 2022-08-24 ENCOUNTER — TELEPHONE (OUTPATIENT)
Dept: CARDIOLOGY | Facility: CLINIC | Age: 62
End: 2022-08-24

## 2022-08-24 LAB
BH CV ECHO MEAS - AO ROOT DIAM: 2.9 CM
BH CV ECHO MEAS - EF(MOD-BP): 36 %
BH CV ECHO MEAS - IVSD: 1.2 CM
BH CV ECHO MEAS - LA DIMENSION: 3.7 CM
BH CV ECHO MEAS - LAT PEAK E' VEL: 5 CM/SEC
BH CV ECHO MEAS - LVIDD: 5.4 CM
BH CV ECHO MEAS - LVIDS: 4.8 CM
BH CV ECHO MEAS - LVPWD: 0.9 CM
BH CV ECHO MEAS - MED PEAK E' VEL: 7 CM/SEC
BH CV ECHO MEAS - MV A MAX VEL: 84 CM/SEC
BH CV ECHO MEAS - MV DEC TIME: 237 MSEC
BH CV ECHO MEAS - MV E MAX VEL: 53 CM/SEC
BH CV ECHO MEAS - MV E/A: 0.6
BH CV ECHO MEASUREMENTS AVERAGE E/E' RATIO: 8.83
IVRT: 61 MSEC
LEFT ATRIUM VOLUME INDEX: 19 ML/M2
MAXIMAL PREDICTED HEART RATE: 158 BPM
STRESS TARGET HR: 134 BPM

## 2022-08-24 NOTE — TELEPHONE ENCOUNTER
----- Message from David Guerrero MD sent at 8/24/2022  9:06 AM EDT -----  Call patient.  Heart squeeze is a little low as it has been in the past.  He is on good medications to help with this.  Valves looked fine.  I do think he can be cleared from a cardiac standpoint for hernia +/- shoulder surgery and considered low to intermediate cardiac risk.

## 2022-09-15 NOTE — PRE-PROCEDURE INSTRUCTIONS
PRE-OP INSTRUCTIONS REVIEWED WITH PATIENT: FASTING/BATHING/ARRIVAL PROCEDURES.  INSTRUCTED TO TAKE A.M. DAY OF SURGERY: ALBUTEROL INHALER PRN, COREG, ASA 81 MG, CHLORTRIMETON PRN  UNDERSTANDING VERBALIZED.

## 2022-09-19 ENCOUNTER — ANESTHESIA EVENT (OUTPATIENT)
Dept: PERIOP | Facility: HOSPITAL | Age: 62
End: 2022-09-19

## 2022-09-19 ENCOUNTER — HOSPITAL ENCOUNTER (OUTPATIENT)
Facility: HOSPITAL | Age: 62
Setting detail: HOSPITAL OUTPATIENT SURGERY
Discharge: HOME | End: 2022-09-19
Attending: SURGERY | Admitting: SURGERY

## 2022-09-19 ENCOUNTER — ANESTHESIA (OUTPATIENT)
Dept: PERIOP | Facility: HOSPITAL | Age: 62
End: 2022-09-19

## 2022-09-19 VITALS
TEMPERATURE: 97.7 F | RESPIRATION RATE: 16 BRPM | OXYGEN SATURATION: 99 % | HEIGHT: 65 IN | HEART RATE: 60 BPM | DIASTOLIC BLOOD PRESSURE: 83 MMHG | SYSTOLIC BLOOD PRESSURE: 155 MMHG | BODY MASS INDEX: 27.7 KG/M2 | WEIGHT: 166.23 LBS

## 2022-09-19 DIAGNOSIS — K40.90 LEFT INGUINAL HERNIA: ICD-10-CM

## 2022-09-19 PROCEDURE — S2900 ROBOTIC SURGICAL SYSTEM: HCPCS | Performed by: SURGERY

## 2022-09-19 PROCEDURE — S2900 ROBOTIC SURGICAL SYSTEM: HCPCS | Performed by: SPECIALIST/TECHNOLOGIST, OTHER

## 2022-09-19 PROCEDURE — 25010000002 HYDROMORPHONE 1 MG/ML SOLUTION: Performed by: NURSE ANESTHETIST, CERTIFIED REGISTERED

## 2022-09-19 PROCEDURE — 49651 LAP ING HERNIA REPAIR RECUR: CPT | Performed by: SURGERY

## 2022-09-19 PROCEDURE — 25010000002 DEXAMETHASONE PER 1 MG: Performed by: NURSE ANESTHETIST, CERTIFIED REGISTERED

## 2022-09-19 PROCEDURE — 49651 LAP ING HERNIA REPAIR RECUR: CPT | Performed by: SPECIALIST/TECHNOLOGIST, OTHER

## 2022-09-19 PROCEDURE — 25010000002 MIDAZOLAM PER 1 MG: Performed by: ANESTHESIOLOGY

## 2022-09-19 PROCEDURE — C1781 MESH (IMPLANTABLE): HCPCS | Performed by: SURGERY

## 2022-09-19 PROCEDURE — 25010000002 CEFAZOLIN IN DEXTROSE 2-4 GM/100ML-% SOLUTION: Performed by: SURGERY

## 2022-09-19 PROCEDURE — 25010000002 PROPOFOL 10 MG/ML EMULSION: Performed by: NURSE ANESTHETIST, CERTIFIED REGISTERED

## 2022-09-19 PROCEDURE — 49650 LAP ING HERNIA REPAIR INIT: CPT | Performed by: SPECIALIST/TECHNOLOGIST, OTHER

## 2022-09-19 PROCEDURE — 0 LIDOCAINE 1 % SOLUTION 10 ML VIAL: Performed by: SURGERY

## 2022-09-19 PROCEDURE — 25010000002 FENTANYL CITRATE (PF) 50 MCG/ML SOLUTION: Performed by: NURSE ANESTHETIST, CERTIFIED REGISTERED

## 2022-09-19 PROCEDURE — 25010000002 ONDANSETRON PER 1 MG: Performed by: NURSE ANESTHETIST, CERTIFIED REGISTERED

## 2022-09-19 PROCEDURE — 0 MEPERIDINE PER 100 MG: Performed by: NURSE ANESTHETIST, CERTIFIED REGISTERED

## 2022-09-19 PROCEDURE — 49650 LAP ING HERNIA REPAIR INIT: CPT | Performed by: SURGERY

## 2022-09-19 DEVICE — MESH PROGRIP LAP S/FIX ABS 10X15CM BX/2: Type: IMPLANTABLE DEVICE | Site: INGUINAL | Status: FUNCTIONAL

## 2022-09-19 DEVICE — ABSORBABLE WOUND CLOSURE DEVICE
Type: IMPLANTABLE DEVICE | Site: INGUINAL | Status: FUNCTIONAL
Brand: V-LOC 180

## 2022-09-19 RX ORDER — MAGNESIUM HYDROXIDE 1200 MG/15ML
LIQUID ORAL AS NEEDED
Status: DISCONTINUED | OUTPATIENT
Start: 2022-09-19 | End: 2022-09-19 | Stop reason: HOSPADM

## 2022-09-19 RX ORDER — SODIUM CHLORIDE 9 MG/ML
40 INJECTION, SOLUTION INTRAVENOUS AS NEEDED
Status: DISCONTINUED | OUTPATIENT
Start: 2022-09-19 | End: 2022-09-19 | Stop reason: HOSPADM

## 2022-09-19 RX ORDER — PROPOFOL 10 MG/ML
VIAL (ML) INTRAVENOUS AS NEEDED
Status: DISCONTINUED | OUTPATIENT
Start: 2022-09-19 | End: 2022-09-19 | Stop reason: SURG

## 2022-09-19 RX ORDER — HYDROCODONE BITARTRATE AND ACETAMINOPHEN 5; 325 MG/1; MG/1
1-2 TABLET ORAL EVERY 4 HOURS PRN
Qty: 20 TABLET | Refills: 0 | Status: SHIPPED | OUTPATIENT
Start: 2022-09-19 | End: 2022-11-23

## 2022-09-19 RX ORDER — FENTANYL CITRATE 50 UG/ML
INJECTION, SOLUTION INTRAMUSCULAR; INTRAVENOUS AS NEEDED
Status: DISCONTINUED | OUTPATIENT
Start: 2022-09-19 | End: 2022-09-19 | Stop reason: SURG

## 2022-09-19 RX ORDER — DOCUSATE SODIUM 100 MG/1
100 CAPSULE, LIQUID FILLED ORAL 2 TIMES DAILY PRN
Qty: 10 CAPSULE | Refills: 1 | Status: SHIPPED | OUTPATIENT
Start: 2022-09-19 | End: 2022-11-23

## 2022-09-19 RX ORDER — ROCURONIUM BROMIDE 10 MG/ML
INJECTION, SOLUTION INTRAVENOUS AS NEEDED
Status: DISCONTINUED | OUTPATIENT
Start: 2022-09-19 | End: 2022-09-19 | Stop reason: SURG

## 2022-09-19 RX ORDER — HYDROCODONE BITARTRATE AND ACETAMINOPHEN 5; 325 MG/1; MG/1
1 TABLET ORAL ONCE AS NEEDED
Status: DISCONTINUED | OUTPATIENT
Start: 2022-09-19 | End: 2022-09-19 | Stop reason: HOSPADM

## 2022-09-19 RX ORDER — CEFAZOLIN SODIUM 2 G/100ML
2 INJECTION, SOLUTION INTRAVENOUS ONCE
Status: COMPLETED | OUTPATIENT
Start: 2022-09-19 | End: 2022-09-19

## 2022-09-19 RX ORDER — LIDOCAINE HYDROCHLORIDE 20 MG/ML
INJECTION, SOLUTION EPIDURAL; INFILTRATION; INTRACAUDAL; PERINEURAL AS NEEDED
Status: DISCONTINUED | OUTPATIENT
Start: 2022-09-19 | End: 2022-09-19 | Stop reason: SURG

## 2022-09-19 RX ORDER — OXYCODONE HYDROCHLORIDE 5 MG/1
5 TABLET ORAL
Status: DISCONTINUED | OUTPATIENT
Start: 2022-09-19 | End: 2022-09-19 | Stop reason: HOSPADM

## 2022-09-19 RX ORDER — SODIUM CHLORIDE, SODIUM LACTATE, POTASSIUM CHLORIDE, CALCIUM CHLORIDE 600; 310; 30; 20 MG/100ML; MG/100ML; MG/100ML; MG/100ML
100 INJECTION, SOLUTION INTRAVENOUS CONTINUOUS
Status: DISCONTINUED | OUTPATIENT
Start: 2022-09-19 | End: 2022-09-19 | Stop reason: HOSPADM

## 2022-09-19 RX ORDER — PROMETHAZINE HYDROCHLORIDE 25 MG/1
25 SUPPOSITORY RECTAL ONCE AS NEEDED
Status: DISCONTINUED | OUTPATIENT
Start: 2022-09-19 | End: 2022-09-19 | Stop reason: HOSPADM

## 2022-09-19 RX ORDER — DEXAMETHASONE SODIUM PHOSPHATE 4 MG/ML
INJECTION, SOLUTION INTRA-ARTICULAR; INTRALESIONAL; INTRAMUSCULAR; INTRAVENOUS; SOFT TISSUE AS NEEDED
Status: DISCONTINUED | OUTPATIENT
Start: 2022-09-19 | End: 2022-09-19 | Stop reason: SURG

## 2022-09-19 RX ORDER — SODIUM CHLORIDE 0.9 % (FLUSH) 0.9 %
10 SYRINGE (ML) INJECTION EVERY 12 HOURS SCHEDULED
Status: DISCONTINUED | OUTPATIENT
Start: 2022-09-19 | End: 2022-09-19 | Stop reason: HOSPADM

## 2022-09-19 RX ORDER — ONDANSETRON 2 MG/ML
INJECTION INTRAMUSCULAR; INTRAVENOUS AS NEEDED
Status: DISCONTINUED | OUTPATIENT
Start: 2022-09-19 | End: 2022-09-19 | Stop reason: SURG

## 2022-09-19 RX ORDER — ACETAMINOPHEN 500 MG
1000 TABLET ORAL ONCE
Status: COMPLETED | OUTPATIENT
Start: 2022-09-19 | End: 2022-09-19

## 2022-09-19 RX ORDER — SODIUM CHLORIDE 0.9 % (FLUSH) 0.9 %
10 SYRINGE (ML) INJECTION AS NEEDED
Status: DISCONTINUED | OUTPATIENT
Start: 2022-09-19 | End: 2022-09-19 | Stop reason: HOSPADM

## 2022-09-19 RX ORDER — MIDAZOLAM HYDROCHLORIDE 1 MG/ML
2 INJECTION INTRAMUSCULAR; INTRAVENOUS ONCE
Status: COMPLETED | OUTPATIENT
Start: 2022-09-19 | End: 2022-09-19

## 2022-09-19 RX ORDER — EPHEDRINE SULFATE 50 MG/ML
INJECTION, SOLUTION INTRAVENOUS AS NEEDED
Status: DISCONTINUED | OUTPATIENT
Start: 2022-09-19 | End: 2022-09-19 | Stop reason: SURG

## 2022-09-19 RX ORDER — ONDANSETRON 2 MG/ML
4 INJECTION INTRAMUSCULAR; INTRAVENOUS ONCE AS NEEDED
Status: DISCONTINUED | OUTPATIENT
Start: 2022-09-19 | End: 2022-09-19 | Stop reason: HOSPADM

## 2022-09-19 RX ORDER — MEPERIDINE HYDROCHLORIDE 25 MG/ML
12.5 INJECTION INTRAMUSCULAR; INTRAVENOUS; SUBCUTANEOUS
Status: DISCONTINUED | OUTPATIENT
Start: 2022-09-19 | End: 2022-09-19 | Stop reason: HOSPADM

## 2022-09-19 RX ORDER — PROMETHAZINE HYDROCHLORIDE 12.5 MG/1
25 TABLET ORAL ONCE AS NEEDED
Status: DISCONTINUED | OUTPATIENT
Start: 2022-09-19 | End: 2022-09-19 | Stop reason: HOSPADM

## 2022-09-19 RX ORDER — SODIUM CHLORIDE, SODIUM LACTATE, POTASSIUM CHLORIDE, CALCIUM CHLORIDE 600; 310; 30; 20 MG/100ML; MG/100ML; MG/100ML; MG/100ML
9 INJECTION, SOLUTION INTRAVENOUS CONTINUOUS PRN
Status: DISCONTINUED | OUTPATIENT
Start: 2022-09-19 | End: 2022-09-19 | Stop reason: HOSPADM

## 2022-09-19 RX ADMIN — MEPERIDINE HYDROCHLORIDE 12.5 MG: 25 INJECTION INTRAMUSCULAR; INTRAVENOUS; SUBCUTANEOUS at 14:48

## 2022-09-19 RX ADMIN — CEFAZOLIN SODIUM 2 G: 2 INJECTION, SOLUTION INTRAVENOUS at 12:49

## 2022-09-19 RX ADMIN — SODIUM CHLORIDE, POTASSIUM CHLORIDE, SODIUM LACTATE AND CALCIUM CHLORIDE 9 ML/HR: 600; 310; 30; 20 INJECTION, SOLUTION INTRAVENOUS at 11:37

## 2022-09-19 RX ADMIN — ACETAMINOPHEN 1000 MG: 500 TABLET ORAL at 11:38

## 2022-09-19 RX ADMIN — MIDAZOLAM HYDROCHLORIDE 2 MG: 1 INJECTION, SOLUTION INTRAMUSCULAR; INTRAVENOUS at 11:40

## 2022-09-19 RX ADMIN — PROPOFOL 160 MG: 10 INJECTION, EMULSION INTRAVENOUS at 12:44

## 2022-09-19 RX ADMIN — ROCURONIUM BROMIDE 50 MG: 10 INJECTION INTRAVENOUS at 12:44

## 2022-09-19 RX ADMIN — FENTANYL CITRATE 50 MCG: 50 INJECTION, SOLUTION INTRAMUSCULAR; INTRAVENOUS at 13:09

## 2022-09-19 RX ADMIN — LIDOCAINE HYDROCHLORIDE 80 MG: 20 INJECTION, SOLUTION EPIDURAL; INFILTRATION; INTRACAUDAL; PERINEURAL at 12:44

## 2022-09-19 RX ADMIN — FENTANYL CITRATE 50 MCG: 50 INJECTION, SOLUTION INTRAMUSCULAR; INTRAVENOUS at 12:42

## 2022-09-19 RX ADMIN — FENTANYL CITRATE 50 MCG: 50 INJECTION, SOLUTION INTRAMUSCULAR; INTRAVENOUS at 13:49

## 2022-09-19 RX ADMIN — OXYCODONE HYDROCHLORIDE 5 MG: 5 TABLET ORAL at 14:47

## 2022-09-19 RX ADMIN — ONDANSETRON 4 MG: 2 INJECTION INTRAMUSCULAR; INTRAVENOUS at 14:01

## 2022-09-19 RX ADMIN — ROCURONIUM BROMIDE 20 MG: 10 INJECTION INTRAVENOUS at 13:44

## 2022-09-19 RX ADMIN — HYDROMORPHONE HYDROCHLORIDE 0.5 MG: 1 INJECTION, SOLUTION INTRAMUSCULAR; INTRAVENOUS; SUBCUTANEOUS at 14:38

## 2022-09-19 RX ADMIN — SUGAMMADEX 150 MG: 100 INJECTION, SOLUTION INTRAVENOUS at 14:08

## 2022-09-19 RX ADMIN — DEXAMETHASONE SODIUM PHOSPHATE 4 MG: 4 INJECTION, SOLUTION INTRA-ARTICULAR; INTRALESIONAL; INTRAMUSCULAR; INTRAVENOUS; SOFT TISSUE at 12:44

## 2022-09-19 RX ADMIN — EPHEDRINE SULFATE 5 MG: 50 INJECTION INTRAVENOUS at 13:02

## 2022-09-19 NOTE — DISCHARGE INSTR - APPOINTMENTS
PLEASE FOLLOW-UP WITH DR MCGREGOR ON Wednesday 10/5/22 @9:30AM        DR MCGREGOR'S OFFICE [phone] EXT#[phone]

## 2022-09-19 NOTE — ANESTHESIA POSTPROCEDURE EVALUATION
Patient: Harjinder Schneider    Procedure Summary     Date: 09/19/22 Room / Location: [facility] OR 08 / [facility] MAIN OR    Anesthesia Start: 1238 Anesthesia Stop: 1429    Procedure: Robotic Bilateral Inguinal Hernia Repair with Mesh Placement (Bilateral Abdomen) Diagnosis:       Left inguinal hernia      (Left inguinal hernia [K40.90])    Surgeons: Josh Aguilar MD Provider: Archie Jacobs MD    Anesthesia Type: general ASA Status: 3          Anesthesia Type: general    Vitals  Vitals Value Taken Time   /85 09/19/22 1533   Temp 36.1 °C (97 °F) 09/19/22 1430   Pulse 48 09/19/22 1534   Resp 13 09/19/22 1520   SpO2 90 % 09/19/22 1534   Vitals shown include unvalidated device data.        Post Anesthesia Care and Evaluation    Patient location during evaluation: bedside  Patient participation: complete - patient participated  Level of consciousness: awake  Pain score: 0  Pain management: adequate    Airway patency: patent  Anesthetic complications: No anesthetic complications  PONV Status: none  Cardiovascular status: acceptable and stable  Respiratory status: acceptable and room air  Hydration status: acceptable    Comments: An Anesthesiologist personally participated in the most demanding procedures (including induction and emergence if applicable) in the anesthesia plan, monitored the course of anesthesia administration at frequent intervals and remained physically present and available for immediate diagnosis and treatment of emergencies.

## 2022-09-19 NOTE — H&P
"History of Present Illness  Harjinder Schneider is a 61 y.o. male presents to [facility] GENER[facility] SUR[facility]ERY to be seen  for an inguinal hernia.     Harjinder Schneider, an adult male, is a new patient and is being seen for an inguinal hernia. He is accompanied by an adult female.     Left inguinal hernia  The patient reports that he has a hernia on his left groin that has been present for 56 years. He states that when he was 6 years old, he had a double inguinal hernia that was repaired until last year. He reports that it is just the left side, and the right is okay. The left is not ok because it was smashed. He reports that it feels like there are \"old wire pieces in there\". He states that he has been awakened in the night at different times feeling like someone stabbed him in the abdomen. He reports that he previously got the flu, and states when he would cough, it felt like something popping back out again. He reports that he had an open hernia repair last year. He states they put in a piece of mesh to try and fix it. He reports that it lasted approximately 6 months. When he blows his nose, it feels like a knife coming from the inside. He states he has a lot of tenderness in his abdomen. He reports that he has not had a problem having a bowel movement. He states since he had this surgery, he feels like he has to urinate, but he cannot get up until he gets unpinched. He reports that a lot of times it feels like he has to go over to the neighbor's house first and then come back. He states that sometimes he will sit on the toilet for 10 minutes before he is finally able to urinate. He reports he only has one kidney that works. He states that the one on the left side does not work. He reports that he also has thin skin as well. The adult female states that the patient's mother has had several hernia repairs. She had the robotic surgery once and it nicked an artery. The patient states that he does not think doing " "another CT scan is necessary. The adult female reports the patient goes to see his cardiologist next month.           Objective         Medical History        Past Medical History:   Diagnosis Date   • Cardiomyopathy (HCC)     • Hypertension     • Renal anomaly              Surgical History         Past Surgical History:   Procedure Laterality Date   • HERNIA REPAIR       • ROTATOR CUFF REPAIR Bilateral                 Current Outpatient Medications:   •  albuterol sulfate  (90 Base) MCG/ACT inhaler, Inhale 2 puffs Every 4 (Four) Hours As Needed for Wheezing., Disp: 18 g, Rfl: 1  •  aspirin 81 MG EC tablet, Take 81 mg by mouth Daily., Disp: , Rfl:   •  carvedilol (COREG) 12.5 MG tablet, Take 1 tablet by mouth 2 (Two) Times a Day., Disp: 180 tablet, Rfl: 0  •  chlorpheniramine (CHLOR-TRIMETON) 4 MG tablet, Take 8 mg by mouth Daily., Disp: , Rfl:   •  lisinopril (PRINIVIL,ZESTRIL) 40 MG tablet, Take 1 tablet by mouth Daily., Disp: 90 tablet, Rfl: 0  •  azithromycin (Zithromax Z-Michael) 250 MG tablet, Take 2 tablets by mouth on day 1, then 1 tablet daily on days 2-5, Disp: 6 tablet, Rfl: 0          Allergies   Allergen Reactions   • Penicillins Unknown - Low Severity               Family History   Problem Relation Age of Onset   • Arthritis Mother     • Thyroid disease Mother     • Stroke Mother     • Hypertension Mother     • Heart disease Father     • Crohn's disease Father           Social History   Social History            Socioeconomic History   • Marital status:    Tobacco Use   • Smoking status: Former Smoker       Packs/day: 1.00       Quit date:        Years since quittin.5   • Smokeless tobacco: Never Used   Vaping Use   • Vaping Use: Never used   Substance and Sexual Activity   • Alcohol use: Yes       Comment: occasionally   • Drug use: Never            Vital Signs:   Resp 16   Ht 167.6 cm (66\")   Wt 74.4 kg (164 lb)   BMI 26.47 kg/m²      Physical Exam  Constitutional:       " Appearance: Normal appearance. He is well-developed and normal weight.      Comments: No acute distress.    Cardiovascular:      Heart sounds: No murmur heard.  Pulmonary:      Effort: Pulmonary effort is normal.      Breath sounds: Normal air entry.      Comments: No acute distress.   Abdominal:      Comments: Abdomen is soft. He has a clear partial reducible hernia on the left groin. He is tender there. There are no overlying skin changes, but it is enlarging by his estimation.   Neurological:      Mental Status: He is alert and oriented to person, place, and time.      Motor: Motor function is intact.                Result Review    :           Procedures            Assessment      Assessment and Plan   There are no diagnoses linked to this encounter.     Assessment and Plan   Diagnoses and all orders for this visit:       1. The patient has a symptomatic recurrent left inguinal hernia. He had it repaired previously somewhere in [place].      The patient will have robotic inguinal hernia repair. The risks, benefits, and alternatives of the procedure were discussed extensively. All questions were answered. The patient voiced understanding and agreed to proceed with the above plan.        Follow Up   No follow-ups on file.  Patient was given instructions and counseling regarding his condition or for health maintenance advice. Please see specific information pulled into the AVS if appropriate.      Discussed with the patient - all questions were answered they voiced understanding and agreed to proceed with above plan         Transcribed from ambient dictation for Josh Aguilar MD by Yumi Moran.  07/20/22   15:05 EDT     Patient verbalized consent to the visit recording.

## 2022-09-19 NOTE — ANESTHESIA PREPROCEDURE EVALUATION
Anesthesia Evaluation     Patient summary reviewed and Nursing notes reviewed                Airway   Mallampati: I  TM distance: >3 FB  Neck ROM: full  No difficulty expected  Dental    (+) upper dentures    Pulmonary - normal exam    breath sounds clear to auscultation  (+) COPD,   Cardiovascular - normal exam    Rhythm: regular  Rate: normal    (+) hypertension,       Neuro/Psych- negative ROS  GI/Hepatic/Renal/Endo    (+)   renal disease,     Musculoskeletal     Abdominal    Substance History - negative use     OB/GYN negative ob/gyn ROS         Other   arthritis,                      Anesthesia Plan    ASA 3     general     intravenous induction     Anesthetic plan, risks, benefits, and alternatives have been provided, discussed and informed consent has been obtained with: patient.        CODE STATUS:

## 2022-09-19 NOTE — DISCHARGE INSTRUCTIONS
DISCHARGE INSTRUCTIONS  HERNIA         For your surgery you had:  General anesthesia (you may have a sore throat for the first 24 hours)  You may experience dizziness, drowsiness, or light-headedness for several hours following surgery/procedure.  Do not stay alone today or tonight.  Limit your activity for 24 hours.  Resume your diet slowly.  Follow whatever special dietary instructions you may have been given by your doctor.  You should not drive, operate machinery, drink alcohol, or sign legally binding documents for 24 hours or while you are taking pain medication.    Last dose of pain medication was given at:   TYLENOL 1000 MG AT 11:38 AM  OXYIR 5 MG AT 2:47 PM  .    NOTIFY YOUR DOCTOR IF YOU EXPERIENCE ANY OF THE FOLLOWING:  Temperature greater than 101 degrees Fahrenheit  Shaking Chills  Redness or excessive drainage from incision  Nausea, vomiting and/or pain that is not controlled by prescribed medications  Increase in bleeding or bleeding that is excessive  Unable to urinate in 6 hours after surgery  If unable to reach your doctor, please go to the closest Emergency Room [x] You may remove dressing:   [] in 24 hours   [] in 48 hours   [x] Other: SKIN ADHESIVE   [x] You may shower or bathe: TUESDAY   Apply an ice pack for 24-48 hours.  [x] Wear a jockey support or tight fitting briefs to prevent  swelling.  Do not do any heavy lifting, pushing or pulling.  You may walk up and down stairs.  You may ride in a car but do not drive until instructed by your physician.  Avoid constipation.  If unable to urinate in 6 to 8 hours after surgery or urinating frequently in small amounts, notify your doctor or go to the nearest Emergency Room.  Medications per physician instructions as indicated on Discharge Medication Information Sheet.      SPECIAL INSTRUCTIONS:

## 2022-09-19 NOTE — OP NOTE
Preoperative diagnosis: Left inguinal hernia    Postoperative diagnosis: Bilateral inguinal hernias    Procedure: Robotic bilateral inguinal hernia repair    Surgeon: Lauren    Anesthesia: General    Assistant: Princess Olmstead    EBL: 11    Specimens: None    Complications: None    Indications: 62-year-old male who presented with a increasingly large asymptomatic left inguinal hernia.  He comes today for elective hernia repair.    PROCEDURE    Patient was seen in the preoperative holding area.  Risks, benefits, alternatives of the operation were again discussed in detail.  All of the patient and family's questions were answered.  They voiced understanding, and agreed to proceed.    Patient was brought to the operating room.  Monitoring devices and Shamir stockings were placed.  Anesthesia was administered.  Patient was prepped and draped in standard surgical fashion.  After prepping and draping a timeout was performed to verify both the correct patient and correct procedure.    We began by injecting local anesthesia in the left upper quadrant.  An incision to accommodate a 12 mm trocar was made in the left upper quadrant, and the Veress needle was inserted into the abdomen.  Intra-abdominal placement was verified with a normal saline drop test.  After verification, the abdomen was insufflated to 15 mmHg pressure.  Once the abdomen was insufflated, we removed the Veress needle.  Then using the Visiport technique the abdomen was entered in the left upper quadrant.  Once the abdomen was entered, we reinserted the laparoscope and looked underneath our Veress needle and Visiport entry sites, making sure there was no underlying injury.  We then went about placing our subsequent trochars.  After injection of local anesthesia and under direct visualization, an 8 mm trocar was placed in the left and right mid abdomen and just above the umbilicus.    We then brought in and docked the robot.  We inspected the groin.  He had an  obvious left inguinal hernia.  He did also have a right inguinal hernia.  Once the robot was in appropriate position, we started on the left and identified the anterior superior iliac spine and made an incision into the peritoneum at this level.  We sharply entered the preperitoneal space.  We allowed CO2 insufflation to aid in our dissection.  We dissected into the preperitoneal space with a combination of blunt dissection and electrocautery.  I dissected medially until I found the pubic tubercle.  We dissected about 2 cm below the level the pubic tubercle.  We then dissected medially to laterally.  During this dissection we reduced any contents in both the direct and indirect spaces.  We did note an old piece of hernia mesh in the left inguinal direct space.  The peritoneum was carefully teased off of this piece of mesh.  It was left in situ.  The hernia appeared to have recurred medial to this piece of mesh.  Once the hernia sac was completely reduced and all contents were free of the direct and indirect space we finished our dissection out laterally to make space for the mesh.  We inspected our operative field and made sure breathing appeared to be hemostatic.  We did not appear to have any underlying injury.  Placed a 10 x 15 piece of ProGrip mesh into the preperitoneal space.  We made sure it covered past the midline and down below the pubic tubercle and our previous dissection field, we also made sure that it covered both the direct and indirect spaces completely with all reduced contents outside of the mesh.  Once we felt that the mesh was in appropriate position we closed the peritoneum over top of the mesh with a running 3 0 V-Loc suture.  We again inspected our operative field and made sure everything appeared to be hemostatic with no obvious underlying injury.    We then started on the right side and identified the anterior superior iliac spine and made an incision into the peritoneum at this level.  We  sharply entered the preperitoneal space.  We allowed CO2 insufflation to aid in our dissection.  We dissected into the preperitoneal space with a combination of blunt dissection and electrocautery.  I dissected medially until I found the pubic tubercle.  We dissected about 2 cm below the level the pubic tubercle.  We then dissected medially to laterally.  During this dissection we reduced any contents in both the direct and indirect spaces.  Once the hernia sac was completely reduced and all contents were free of the direct and indirect space we finished our dissection out laterally to make space for the mesh.  We inspected our operative field and made sure breathing appeared to be hemostatic.  We did not appear to have any underlying injury.  Placed a 10 x 12 piece of ProGrip mesh into the preperitoneal space.  We made sure it covered past the midline and down below the pubic tubercle and our previous dissection field, we also made sure that it covered both the direct and indirect spaces completely with all reduced contents outside of the mesh.  Once we felt that the mesh was in appropriate position we closed the peritoneum over top of the mesh with a running 3 0 V-Loc suture.  We again inspected our operative field and made sure everything appeared to be hemostatic with no obvious underlying injury.    We then removed the 12 mm trocar, and that site was closed with a Rolf Ramirez device and 0 Vicryl suture under direct visualization.  The remaining trochars were then removed under direct visualization, and the abdomen was desufflated.  Skin incisions were closed with subcuticular 4-0 Vicryl stitch and dressed with skin glue.    The patient was then aroused anesthesia, and taken off the OR table, and taken to PACU in stable condition.  Sponge, needle, and instrument counts were correct x2.  Princess Olmstead was present for the entire the procedure and helped in all portions of the case.    Assistant: Princess Olmstead, GABY  was responsible for performing the following activities: Retraction, Suction, Irrigation, Suturing, Closing, Placing Dressing and Held/Positioned Camera and their skilled assistance was necessary for the success of this case.      The operative note was dictated with the help of the dragon dictation system.

## 2022-10-04 ENCOUNTER — OFFICE VISIT (OUTPATIENT)
Dept: FAMILY MEDICINE CLINIC | Facility: CLINIC | Age: 62
End: 2022-10-04

## 2022-10-04 VITALS
HEIGHT: 65 IN | BODY MASS INDEX: 27.66 KG/M2 | DIASTOLIC BLOOD PRESSURE: 80 MMHG | OXYGEN SATURATION: 98 % | SYSTOLIC BLOOD PRESSURE: 122 MMHG | HEART RATE: 64 BPM | WEIGHT: 166 LBS | TEMPERATURE: 97.7 F

## 2022-10-04 DIAGNOSIS — I42.9 IDIOPATHIC CARDIOMYOPATHY: ICD-10-CM

## 2022-10-04 DIAGNOSIS — M54.6 THORACIC BACK PAIN, UNSPECIFIED BACK PAIN LATERALITY, UNSPECIFIED CHRONICITY: ICD-10-CM

## 2022-10-04 DIAGNOSIS — R10.9 LEFT FLANK PAIN: Primary | ICD-10-CM

## 2022-10-04 PROCEDURE — 99214 OFFICE O/P EST MOD 30 MIN: CPT | Performed by: NURSE PRACTITIONER

## 2022-10-04 RX ORDER — CARVEDILOL 12.5 MG/1
12.5 TABLET ORAL 2 TIMES DAILY
Qty: 180 TABLET | Refills: 1 | Status: SHIPPED | OUTPATIENT
Start: 2022-10-04 | End: 2023-04-06 | Stop reason: SDUPTHER

## 2022-10-04 RX ORDER — DICLOFENAC SODIUM 75 MG/1
75 TABLET, DELAYED RELEASE ORAL 2 TIMES DAILY
Qty: 180 TABLET | Refills: 0 | Status: SHIPPED | OUTPATIENT
Start: 2022-10-04 | End: 2022-12-28 | Stop reason: SDUPTHER

## 2022-10-04 RX ORDER — CYCLOBENZAPRINE HCL 10 MG
10 TABLET ORAL 3 TIMES DAILY PRN
Qty: 30 TABLET | Refills: 0 | Status: SHIPPED | OUTPATIENT
Start: 2022-10-04 | End: 2022-10-20 | Stop reason: SDUPTHER

## 2022-10-04 RX ORDER — LISINOPRIL 40 MG/1
40 TABLET ORAL DAILY
Qty: 90 TABLET | Refills: 1 | Status: SHIPPED | OUTPATIENT
Start: 2022-10-04 | End: 2023-04-06 | Stop reason: SDUPTHER

## 2022-10-04 NOTE — PROGRESS NOTES
Chief Complaint  Hypertension (Refills )    Subjective        Past Medical History:   Diagnosis Date   • Arthritis     RA   • Cardiomyopathy (HCC)     KAMILLA CARDIOLOGIST, LOV 2022   • COPD (chronic obstructive pulmonary disease) ([facility])     INHALER   • Hypertension    • Inguinal hernia     LEFT   • Kidney damage     STATES HAS ONLY ONE FUNCTIONING KIDNEY, NO ISSUES   • Renal anomaly      Social History     Tobacco Use   • Smoking status: Former Smoker     Packs/day: 1.00     Quit date:      Years since quittin.7   • Smokeless tobacco: Never Used   Vaping Use   • Vaping Use: Never used   Substance Use Topics   • Alcohol use: Yes     Comment: occasionally   • Drug use: Never      Current Outpatient Medications on File Prior to Visit   Medication Sig   • albuterol sulfate  (90 Base) MCG/ACT inhaler Inhale 2 puffs Every 4 (Four) Hours As Needed for Wheezing.   • aspirin 81 MG EC tablet Take 81 mg by mouth Daily. NO STOP PER DR. MCGREGOR   • chlorpheniramine (CHLOR-TRIMETON) 4 MG tablet Take 8 mg by mouth Daily.   • docusate sodium (Colace) 100 MG capsule Take 1 capsule by mouth 2 (Two) Times a Day As Needed for Constipation.   • HYDROcodone-acetaminophen (NORCO) 5-325 MG per tablet Take 1-2 tablets by mouth Every 4 (Four) Hours As Needed (Pain).   • [DISCONTINUED] carvedilol (COREG) 12.5 MG tablet Take 1 tablet by mouth 2 (Two) Times a Day. (Patient taking differently: Take 12.5 mg by mouth 2 (Two) Times a Day. INST PER ANESTHESIA PROTOCOL)   • [DISCONTINUED] lisinopril (PRINIVIL,ZESTRIL) 40 MG tablet Take 1 tablet by mouth Daily. (Patient taking differently: Take 40 mg by mouth Daily. INST PER ANESTHESIA PROTOCOL)     No current facility-administered medications on file prior to visit.      Allergies   Allergen Reactions   • Penicillins Unknown - Low Severity     CHILDHOOD ALLERGY UNKNOWN REACTION BY PT      Health Maintenance Due   Topic Date Due   • COLORECTAL CANCER SCREENING  Never done   •  "Pneumococcal Vaccine 0-64 (1 - PCV) Never done   • TDAP/TD VACCINES (1 - Tdap) Never done   • ZOSTER VACCINE (1 of 2) Never done   • COVID-19 Vaccine (4 - Booster for Pfizer series) 03/23/2022   • ANNUAL WELLNESS VISIT  Never done   • INFLUENZA VACCINE  08/01/2022      Harjinder Schneider presents to [facility]  History of Present Illness  Here to follow up on HTN. BP normal in office today. Pt does not monitor BP at home.     Notes left middle/side back discomfort and states it improves with rest but at times is consistent. Pt will intermittently worsen. Denies difficulty with urination or blood in urine. No known hx of rental stones. Pt notes symptoms for 7 months or so. Pt states states he will take ASA if needed. Denies nausea or vomiting with the severe episodes of pain. Pt reports that his left kidney is non-functioning. Pt states that the only thing that has helped in the past is Soma muscle relaxant    Pt had hernia repair and will follow up with Dr. Aguilar tomorrow.      Objective   Vital Signs:   /80 (BP Location: Left arm)   Pulse 64   Temp 97.7 °F (36.5 °C)   Ht 165.1 cm (65\")   Wt 75.3 kg (166 lb)   SpO2 98%   BMI 27.62 kg/m²     Review of Systems   Respiratory: Negative for shortness of breath.    Cardiovascular: Negative for chest pain and palpitations.   Neurological: Negative for dizziness, light-headedness and headache.      Physical Exam  Vitals reviewed.   Constitutional:       General: He is not in acute distress.     Appearance: Normal appearance. He is well-developed.   HENT:      Head: Normocephalic and atraumatic.      Right Ear: External ear normal.      Left Ear: External ear normal.   Eyes:      Conjunctiva/sclera: Conjunctivae normal.      Pupils: Pupils are equal, round, and reactive to light.   Cardiovascular:      Rate and Rhythm: Normal rate and regular rhythm.      Heart sounds: Normal heart sounds.   Pulmonary:      Effort: Pulmonary effort " is normal.      Breath sounds: Normal breath sounds.   Abdominal:      Tenderness: There is no right CVA tenderness or left CVA tenderness.   Musculoskeletal:      Cervical back: Neck supple.      Right lower leg: No edema.      Left lower leg: No edema.   Skin:     General: Skin is warm and dry.   Neurological:      Mental Status: He is alert and oriented to person, place, and time.      Cranial Nerves: No cranial nerve deficit.   Psychiatric:         Mood and Affect: Mood and affect normal.         Behavior: Behavior normal.         Thought Content: Thought content normal.         Judgment: Judgment normal.        Result Review :   The following data was reviewed by: TROY Richard on 10/04/2022:  Common labs    Common Labs 7/6/22 7/6/22 7/6/22 7/19/22    1023 1023 1023    Glucose  91     BUN  22     Creatinine  1.00     Sodium  137     Potassium  4.7     Chloride  103     Calcium  9.0     Albumin  4.10     Total Bilirubin  0.3     Alkaline Phosphatase  70     AST (SGOT)  5     ALT (SGPT)  10     WBC 13.40 (A)   10.31   Hemoglobin 15.0   14.4   Hematocrit 44.9   43.0   Platelets 230   221   Total Cholesterol   143    Triglycerides   54    HDL Cholesterol   52    LDL Cholesterol    79    (A) Abnormal value                Data reviewed: Radiologic studies thoracic back pain          Assessment and Plan    Diagnoses and all orders for this visit:    1. Left flank pain (Primary)  -     US Renal Bilateral; Future    2. Idiopathic cardiomyopathy (HCC)  -     carvedilol (COREG) 12.5 MG tablet; Take 1 tablet by mouth 2 (Two) Times a Day.  Dispense: 180 tablet; Refill: 1  -     lisinopril (PRINIVIL,ZESTRIL) 40 MG tablet; Take 1 tablet by mouth Daily.  Dispense: 90 tablet; Refill: 1    3. Thoracic back pain, unspecified back pain laterality, unspecified chronicity  -     XR Spine Thoracic 3 View (In Office)  -     cyclobenzaprine (FLEXERIL) 10 MG tablet; Take 1 tablet by mouth 3 (Three) Times a Day As Needed for  Muscle Spasms.  Dispense: 30 tablet; Refill: 0  -     diclofenac (VOLTAREN) 75 MG EC tablet; Take 1 tablet by mouth 2 (Two) Times a Day.  Dispense: 180 tablet; Refill: 0        Follow Up   Return in about 6 months (around 4/4/2023) for Refills and fasting labs.  Patient was given instructions and counseling regarding his condition or for health maintenance advice. Please see specific information pulled into the AVS if appropriate.

## 2022-10-05 ENCOUNTER — OFFICE VISIT (OUTPATIENT)
Dept: SURGERY | Facility: CLINIC | Age: 62
End: 2022-10-05

## 2022-10-05 VITALS — BODY MASS INDEX: 26.82 KG/M2 | WEIGHT: 161 LBS | HEIGHT: 65 IN | RESPIRATION RATE: 16 BRPM

## 2022-10-05 DIAGNOSIS — K40.90 LEFT INGUINAL HERNIA: Primary | ICD-10-CM

## 2022-10-05 PROCEDURE — 99024 POSTOP FOLLOW-UP VISIT: CPT | Performed by: SURGERY

## 2022-10-05 NOTE — PROGRESS NOTES
"Chief Complaint: Post-op Hernia (Left inguinal )    Subjective         History of Present Illness  Harjinder Schneider is a 62 y.o. male presents to [facility]. The patient is to be seen for status post bilateral inguinal hernia repair. He is accompanied by an adult female today.    Status post bilateral inguinal hernia repair  The patient is doing well overall, and healing expectedly. He continues to experience mild pain in his left upper quadrant incision site, and feels like there is a \"knot\" in that region. The patient's pain is exacerbated with forward flexion, and alleviated with rest. He adds that on 10/04/2022 he had an onset of severe pain and felt like \"someone jabbing a knife right here\" left upper quadrant(location not specified). The patient reports he has been experiencing nausea at night and this morning, 10/05/2022. He states that he sneezed this morning and had significant pain in his groin. He reports an episode of emesis status post operation when he attempted to eat roberto's pie; however, the adult female denies any emesis since that time. He has a history of an open hernia repair, and the adult female adds that he is recovery fairly appropriate compare to his prior surgery.      Chronic back pain  The patient reports a history of severe back pain for approximately 7 months, which he believes is related to his scoliosis. He was evaluated by his primary care physician on 10/04/2022, who obtained an x-ray of his spine. He was provided with a muscle relaxer and an anti-inflammatory, and he was advised to obtain a renal ultrasound for further evaluation.       Objective     Past Medical History:   Diagnosis Date   • Arthritis     RA   • Cardiomyopathy ([facility])     KAMILLA CARDIOLOGIST, LOV 8/2022   • COPD (chronic obstructive pulmonary disease) ([facility])     INHALER   • Hypertension    • Inguinal hernia     LEFT   • Kidney damage     STATES HAS ONLY ONE FUNCTIONING KIDNEY, NO " ISSUES   • Renal anomaly        Past Surgical History:   Procedure Laterality Date   • HAND SURGERY Right     KNUCKLE REPLACEMENT( OP AND REOP) AND THUMB RECONSTRUCTION   • HERNIA REPAIR Left 07/2021    INGUINAL   • INGUINAL HERNIA REPAIR Bilateral 9/19/2022    Procedure: Robotic Bilateral Inguinal Hernia Repair with Mesh Placement;  Surgeon: Josh Aguilar MD;  Location: [facility] MAIN OR;  Service: Robotics - DaVinci;  Laterality: Bilateral;   • ROTATOR CUFF REPAIR Bilateral          Current Outpatient Medications:   •  albuterol sulfate  (90 Base) MCG/ACT inhaler, Inhale 2 puffs Every 4 (Four) Hours As Needed for Wheezing., Disp: 18 g, Rfl: 1  •  aspirin 81 MG EC tablet, Take 81 mg by mouth Daily. NO STOP PER DR. AGUILAR, Disp: , Rfl:   •  carvedilol (COREG) 12.5 MG tablet, Take 1 tablet by mouth 2 (Two) Times a Day., Disp: 180 tablet, Rfl: 1  •  chlorpheniramine (CHLOR-TRIMETON) 4 MG tablet, Take 8 mg by mouth Daily., Disp: , Rfl:   •  cyclobenzaprine (FLEXERIL) 10 MG tablet, Take 1 tablet by mouth 3 (Three) Times a Day As Needed for Muscle Spasms., Disp: 30 tablet, Rfl: 0  •  diclofenac (VOLTAREN) 75 MG EC tablet, Take 1 tablet by mouth 2 (Two) Times a Day., Disp: 180 tablet, Rfl: 0  •  docusate sodium (Colace) 100 MG capsule, Take 1 capsule by mouth 2 (Two) Times a Day As Needed for Constipation., Disp: 10 capsule, Rfl: 1  •  HYDROcodone-acetaminophen (NORCO) 5-325 MG per tablet, Take 1-2 tablets by mouth Every 4 (Four) Hours As Needed (Pain)., Disp: 20 tablet, Rfl: 0  •  lisinopril (PRINIVIL,ZESTRIL) 40 MG tablet, Take 1 tablet by mouth Daily., Disp: 90 tablet, Rfl: 1    Allergies   Allergen Reactions   • Penicillins Unknown - Low Severity     CHILDHOOD ALLERGY UNKNOWN REACTION BY PT        Family History   Problem Relation Age of Onset   • Arthritis Mother    • Thyroid disease Mother    • Stroke Mother    • Hypertension Mother    • Heart disease Father    • Crohn's disease Father    • Malig  Hyperthermia Neg Hx        Social History     Socioeconomic History   • Marital status:    Tobacco Use   • Smoking status: Former Smoker     Packs/day: 1.00     Quit date:      Years since quittin.7   • Smokeless tobacco: Never Used   Vaping Use   • Vaping Use: Never used   Substance and Sexual Activity   • Alcohol use: Yes     Comment: occasionally   • Drug use: Never   • Sexual activity: Defer        Physical Exam   Post Surgical Incision  Surgical wound: Incision is healing well. Minor ecchymosis around his periumbilical incision. Otherwise, everything looks good. No signs of recurrence on the hernia repair, even with Valsalva.  No acute distress. Nonlabored breathing. Abdomen is soft.     Result Review :               Assessment and Plan {CC Problem List  Visit Diagnosis  ROS  Review (Popup)  Health Maintenance  Quality  BestPractice  Medications  SmartSets  SnapShot Encounters  Media :23}   There are no diagnoses linked to this encounter.     1. Status post robotic bilateral inguinal hernia repair  The patient is doing well overall and healing expected. I am pleased with his overall progress and he can follow-up with me as needed. Call with any questions or concerns. He is able to gradually increase his activities as tolerated.     Discussed with the patient - all questions were answered they voiced understanding and agreed to proceed with above plan      Follow Up   No follow-ups on file.  Patient was given instructions and counseling regarding his condition or for health maintenance advice. Please see specific information pulled into the AVS if appropriate.       Transcribed from ambient dictation for Josh Aguilar MD by Marjorie Amanda.  10/05/22   11:06 EDT    Patient or patient representative verbalized consent to the visit recording.  I have personally performed the services described in this document as transcribed by the above individual, and it is both accurate and  complete.  Patient verbalized consent to the visit recording.

## 2022-10-13 ENCOUNTER — HOSPITAL ENCOUNTER (OUTPATIENT)
Dept: ULTRASOUND IMAGING | Facility: HOSPITAL | Age: 62
Discharge: HOME | End: 2022-10-13
Admitting: NURSE PRACTITIONER

## 2022-10-13 DIAGNOSIS — R10.9 LEFT FLANK PAIN: ICD-10-CM

## 2022-10-13 PROCEDURE — 76775 US EXAM ABDO BACK WALL LIM: CPT

## 2022-10-20 ENCOUNTER — OFFICE VISIT (OUTPATIENT)
Dept: FAMILY MEDICINE CLINIC | Facility: CLINIC | Age: 62
End: 2022-10-20

## 2022-10-20 VITALS
TEMPERATURE: 98.2 F | WEIGHT: 169 LBS | HEIGHT: 65 IN | SYSTOLIC BLOOD PRESSURE: 130 MMHG | OXYGEN SATURATION: 99 % | BODY MASS INDEX: 28.16 KG/M2 | DIASTOLIC BLOOD PRESSURE: 82 MMHG | HEART RATE: 71 BPM

## 2022-10-20 DIAGNOSIS — N28.1 RENAL CYST, RIGHT: ICD-10-CM

## 2022-10-20 DIAGNOSIS — G89.29 CHRONIC PAIN OF BOTH SHOULDERS: ICD-10-CM

## 2022-10-20 DIAGNOSIS — N26.1 ATROPHY OF LEFT KIDNEY: Primary | ICD-10-CM

## 2022-10-20 DIAGNOSIS — M54.6 THORACIC BACK PAIN, UNSPECIFIED BACK PAIN LATERALITY, UNSPECIFIED CHRONICITY: ICD-10-CM

## 2022-10-20 DIAGNOSIS — M25.512 CHRONIC PAIN OF BOTH SHOULDERS: ICD-10-CM

## 2022-10-20 DIAGNOSIS — M25.511 CHRONIC PAIN OF BOTH SHOULDERS: ICD-10-CM

## 2022-10-20 PROCEDURE — 99214 OFFICE O/P EST MOD 30 MIN: CPT | Performed by: NURSE PRACTITIONER

## 2022-10-20 RX ORDER — CYCLOBENZAPRINE HCL 10 MG
10 TABLET ORAL 3 TIMES DAILY PRN
Qty: 90 TABLET | Refills: 1 | Status: SHIPPED | OUTPATIENT
Start: 2022-10-20 | End: 2022-12-28 | Stop reason: SDUPTHER

## 2022-10-20 NOTE — PROGRESS NOTES
Chief Complaint  Results (Follow up to U/S)    Subjective        Past Medical History:   Diagnosis Date   • Arthritis     RA   • Cardiomyopathy (HCC)     KAMILLA CARDIOLOGIST, LOV 2022   • COPD (chronic obstructive pulmonary disease) ([facility])     INHALER   • Hypertension    • Inguinal hernia     LEFT   • Kidney damage     STATES HAS ONLY ONE FUNCTIONING KIDNEY, NO ISSUES   • Renal anomaly      Social History     Tobacco Use   • Smoking status: Former     Packs/day: 1.00     Types: Cigarettes     Quit date:      Years since quittin.8   • Smokeless tobacco: Never   Vaping Use   • Vaping Use: Never used   Substance Use Topics   • Alcohol use: Yes     Comment: occasionally   • Drug use: Never      Current Outpatient Medications on File Prior to Visit   Medication Sig   • albuterol sulfate  (90 Base) MCG/ACT inhaler Inhale 2 puffs Every 4 (Four) Hours As Needed for Wheezing.   • aspirin 81 MG EC tablet Take 81 mg by mouth Daily. NO STOP PER DR. MCGREGOR   • carvedilol (COREG) 12.5 MG tablet Take 1 tablet by mouth 2 (Two) Times a Day.   • chlorpheniramine (CHLOR-TRIMETON) 4 MG tablet Take 8 mg by mouth Daily.   • diclofenac (VOLTAREN) 75 MG EC tablet Take 1 tablet by mouth 2 (Two) Times a Day.   • docusate sodium (Colace) 100 MG capsule Take 1 capsule by mouth 2 (Two) Times a Day As Needed for Constipation.   • HYDROcodone-acetaminophen (NORCO) 5-325 MG per tablet Take 1-2 tablets by mouth Every 4 (Four) Hours As Needed (Pain).   • lisinopril (PRINIVIL,ZESTRIL) 40 MG tablet Take 1 tablet by mouth Daily.   • [DISCONTINUED] cyclobenzaprine (FLEXERIL) 10 MG tablet Take 1 tablet by mouth 3 (Three) Times a Day As Needed for Muscle Spasms.     No current facility-administered medications on file prior to visit.      Allergies   Allergen Reactions   • Penicillins Unknown - Low Severity     CHILDHOOD ALLERGY UNKNOWN REACTION BY PT      Health Maintenance Due   Topic Date Due   • COLORECTAL CANCER SCREENING  Never  "done   • Pneumococcal Vaccine 0-64 (1 - PCV) Never done   • TDAP/TD VACCINES (1 - Tdap) Never done   • ZOSTER VACCINE (1 of 2) Never done   • ANNUAL WELLNESS VISIT  Never done      Harjinder Schneider presents to [facility]  History of Present Illness  Here to follow up on renal ultrasound. US shows that left kidney is atrophic. Pt states he was told that in the past. Right kidney normal except for a cyst on the kidney. Pt states the pain is improved in the left flank area. Pt states he takes the cyclobenzaprine up to 3x/day and denies drowsiness with use of medication.     Pt states he has right shoulder pain x years. Pt denies improvement in pain since starting on Diclofenac.      Pt states he is waking a couple times per night to void and notes frequent urination.       Objective   Vital Signs:   /82 (BP Location: Left arm)   Pulse 71   Temp 98.2 °F (36.8 °C)   Ht 165.1 cm (65\")   Wt 76.7 kg (169 lb)   SpO2 99%   BMI 28.12 kg/m²     Review of Systems   Physical Exam   Result Review :   The following data was reviewed by: TROY Richard on 10/20/2022:  Common labs    Common Labs 7/6/22 7/6/22 7/6/22 7/19/22    1023 1023 1023    Glucose  91     BUN  22     Creatinine  1.00     Sodium  137     Potassium  4.7     Chloride  103     Calcium  9.0     Albumin  4.10     Total Bilirubin  0.3     Alkaline Phosphatase  70     AST (SGOT)  5     ALT (SGPT)  10     WBC 13.40 (A)   10.31   Hemoglobin 15.0   14.4   Hematocrit 44.9   43.0   Platelets 230   221   Total Cholesterol   143    Triglycerides   54    HDL Cholesterol   52    LDL Cholesterol    79    (A) Abnormal value            Data reviewed: Radiologic studies Renal US          Assessment and Plan    Diagnoses and all orders for this visit:    1. Atrophy of left kidney (Primary)  -     Ambulatory Referral to Nephrology    2. Renal cyst, right  -     Ambulatory Referral to Nephrology    3. Thoracic back pain, unspecified back " pain laterality, unspecified chronicity  -     cyclobenzaprine (FLEXERIL) 10 MG tablet; Take 1 tablet by mouth 3 (Three) Times a Day As Needed for Muscle Spasms.  Dispense: 90 tablet; Refill: 1    4. Chronic pain of both shoulders    Call and schedule follow up with Ortho for shoulder pain.         Follow Up   Return if symptoms worsen or fail to improve.  Patient was given instructions and counseling regarding his condition or for health maintenance advice. Please see specific information pulled into the AVS if appropriate.

## 2022-10-21 ENCOUNTER — TELEPHONE (OUTPATIENT)
Dept: ORTHOPEDIC SURGERY | Facility: CLINIC | Age: 62
End: 2022-10-21

## 2022-10-21 NOTE — TELEPHONE ENCOUNTER
Caller: Harjinder Schneider    Relationship to patient: Self    Best call back number: [phone]    Chief complaint: LEFT SHOULDER     Type of visit: SURGERY    Requested date: FIRST AVAILABLE     If rescheduling, when is the original appointment:     Additional notes: HAD HERNIA REPAIR AND IS NOW RELEASED AND WOULD LILKE TO GO AHEAD AND SHELBI ZEPEDA

## 2022-10-26 ENCOUNTER — OFFICE VISIT (OUTPATIENT)
Dept: ORTHOPEDIC SURGERY | Facility: CLINIC | Age: 62
End: 2022-10-26

## 2022-10-26 ENCOUNTER — PREP FOR SURGERY (OUTPATIENT)
Dept: OTHER | Facility: HOSPITAL | Age: 62
End: 2022-10-26

## 2022-10-26 VITALS — OXYGEN SATURATION: 98 % | WEIGHT: 168.8 LBS | HEART RATE: 69 BPM | HEIGHT: 65 IN | BODY MASS INDEX: 28.12 KG/M2

## 2022-10-26 DIAGNOSIS — M12.812 ROTATOR CUFF ARTHROPATHY OF BOTH SHOULDERS: Primary | ICD-10-CM

## 2022-10-26 DIAGNOSIS — M12.811 ROTATOR CUFF ARTHROPATHY OF BOTH SHOULDERS: Primary | ICD-10-CM

## 2022-10-26 PROBLEM — M19.011 ARTHRITIS OF RIGHT SHOULDER REGION: Status: ACTIVE | Noted: 2022-10-26

## 2022-10-26 PROCEDURE — 99214 OFFICE O/P EST MOD 30 MIN: CPT | Performed by: STUDENT IN AN ORGANIZED HEALTH CARE EDUCATION/TRAINING PROGRAM

## 2022-10-26 RX ORDER — CEFAZOLIN SODIUM IN 0.9 % NACL 3 G/100 ML
3 INTRAVENOUS SOLUTION, PIGGYBACK (ML) INTRAVENOUS ONCE
Status: CANCELLED | OUTPATIENT
Start: 2022-10-26 | End: 2022-10-26

## 2022-10-26 RX ORDER — CEFAZOLIN SODIUM 2 G/100ML
2 INJECTION, SOLUTION INTRAVENOUS ONCE
Status: CANCELLED | OUTPATIENT
Start: 2022-10-26 | End: 2022-10-26

## 2022-10-26 RX ORDER — TRANEXAMIC ACID 10 MG/ML
1000 INJECTION, SOLUTION INTRAVENOUS ONCE
Status: CANCELLED | OUTPATIENT
Start: 2022-10-26 | End: 2022-10-26

## 2022-10-26 RX ORDER — POVIDONE-IODINE 10 MG/ML
SOLUTION TOPICAL ONCE
Status: CANCELLED | OUTPATIENT
Start: 2022-10-26 | End: 2022-10-26

## 2022-10-26 NOTE — PROGRESS NOTES
"Chief Complaint  Pain and Follow-up of the Right Shoulder and Pain and Follow-up of the Left Shoulder    Subjective          Harjinder Schneider presents to [facility] for   History of Present Illness    The patient presents here today for follow up evaluation of the right shoulder and left shoulder. The patient has been treating his rotator cuff arthropathy conservatively. He reports his right is worse than the left. He has pain at night.  He has pain at rest.  He has pain with activity.  He has pain with reaching and lifting.  Shoulders feel weak bilaterally.  He denies any radiating pain or numbness.  The pain localizes to the lateral shoulders.  He is right-hand dominant. He is a rheumatoid patient.  He has had bilateral shoulder surgery in the past, approximately 8 years ago.  He had attempted rotator cuff repairs done bilaterally.     Allergies   Allergen Reactions   • Penicillins Unknown - Low Severity     CHILDHOOD ALLERGY UNKNOWN REACTION BY PT        Social History     Socioeconomic History   • Marital status:    Tobacco Use   • Smoking status: Former     Packs/day: 1.00     Types: Cigarettes     Quit date:      Years since quittin.8   • Smokeless tobacco: Never   Vaping Use   • Vaping Use: Never used   Substance and Sexual Activity   • Alcohol use: Yes     Comment: occasionally   • Drug use: Never   • Sexual activity: Defer        I reviewed the patient's chief complaint, history of present illness, review of systems, past medical history, surgical history, family history, social history, medications, and allergy list.     REVIEW OF SYSTEMS    Constitutional: Denies fevers, chills, weight loss  Cardiovascular: Denies chest pain, shortness of breath  Skin: Denies rashes, acute skin changes  Neurologic: Denies headache, loss of consciousness  MSK: Bilateral shoulder pain      Objective   Vital Signs:   Pulse 69   Ht 165.1 cm (65\")   Wt 76.6 kg (168 lb 12.8 oz)   SpO2 " 98%   BMI 28.09 kg/m²     Body mass index is 28.09 kg/m².    Physical Exam    General: Alert. No acute distress.   Right upper extremity: No areas of tenderness.  Active elevation 180 degrees, external rotation to 45 degrees, internal rotation to the lower lumbar spine.  4- out of 5 with supraspinatus and infraspinatus testing.  5 out of 5 subscapularis testing.  No mechanical symptoms with motion.  Pain with speeds testing.  No pain with crossarm adduction.  Distal neurovascular intact.     Left upper extremity: No areas of tenderness.  Active elevation 180 degrees.  External rotation 45 degrees.  Internal rotation of the lower lumbar spine.  4-5 with supraspinatus and infraspinatus testing.  5 out of 5 subscapularis testing.  Slight crepitus with motion.  Pain with speeds testing.  No pain with crossarm adduction.  Distal neurovascular intact.    Procedures    Imaging Results (Most Recent)     None                   Assessment and Plan        XR Spine Thoracic 3 View (In Office)    Result Date: 10/4/2022  Narrative: PROCEDURE: XR SPINE THORACIC 3 VW  COMPARISON: None  INDICATIONS: thoracic back pain  FINDINGS:  No acute fracture is identified.  There is levoscoliosis of the mid thoracic spine.  The vertebral body heights appear normal.  Moderate discogenic changes are present throughout.  The soft tissues are unremarkable.      Impression:   1. No acute osseous process identified. 2. Multilevel degenerative changes as described above.     ERIC GUERRERO MD       Electronically Signed and Approved By: ERIC GUERRERO MD on 10/04/2022 at 13:48             US Renal Bilateral    Result Date: 10/14/2022  Narrative: PROCEDURE: US RENAL BILATERAL  COMPARISON: None  INDICATIONS: Left flank pain  FINDINGS:  The right kidney measures 12.7 centimeters in length.  There is no evidence for hydronephrosis.  Small right renal cyst measures about 1.5 centimeters.  There are calcifications in the liver which could be due to  granulomatous disease.  The left kidney is not well visualized.  It may be atrophic.  By report patient has not had any surgery.  Bladder appears within normal limits.  Bilateral ureteral jets are seen.      Impression:   1. The left kidney is not visible sonographically.  It may be atrophic.  No previous cross-sectional imaging; consider follow-up CT to further evaluate. 2. Right kidney appears unremarkable with the exception of a small right renal cyst. 3. Unremarkable bladder.      FAUSTINO JACKSON MD       Electronically Signed and Approved By: FAUSTINO JACKSON MD on 10/14/2022 at 8:54                Diagnoses and all orders for this visit:    1. Rotator cuff arthropathy of both shoulders (Primary)        Discussed the treatment options with the patient, operative vs non-operative. The patient has failed conservative treatment. Discussed the risks and benefits of a Right Reverse Total Shoulder Arthroplasty with the patient. The patient expressed understanding and wished to proceed. Plan for preoperative CT Scan.     Discussed surgery., Risks/benefits discussed with patient including, but not limited to: infection, bleeding, neurovascular damage, malunion, nonunion, aesthetic deformity, need for further surgery, and death., Discussed with patient the implant type being used during surgery and patient understands and desires to proceed., Surgery pamphlet given. and Call or return if worsening symptoms.    Scribed for Vadim Rooney MD by Sana Avery  10/26/2022   10:02 EDT         Follow Up       2 weeks postoperatively.      Patient was given instructions and counseling regarding his condition or for health maintenance advice. Please see specific information pulled into the AVS if appropriate.       I have personally performed the services described in this document as scribed by the above individual and it is both accurate and complete.     Vadim Rooney MD  10/26/22  10:07 EDT

## 2022-10-31 ENCOUNTER — TRANSCRIBE ORDERS (OUTPATIENT)
Dept: ADMINISTRATIVE | Facility: HOSPITAL | Age: 62
End: 2022-10-31

## 2022-10-31 DIAGNOSIS — N28.1 RENAL CYST: Primary | ICD-10-CM

## 2022-11-01 ENCOUNTER — HOSPITAL ENCOUNTER (OUTPATIENT)
Dept: CT IMAGING | Facility: HOSPITAL | Age: 62
Discharge: HOME | End: 2022-11-01
Admitting: STUDENT IN AN ORGANIZED HEALTH CARE EDUCATION/TRAINING PROGRAM

## 2022-11-01 ENCOUNTER — TELEPHONE (OUTPATIENT)
Dept: CARDIOLOGY | Facility: CLINIC | Age: 62
End: 2022-11-01

## 2022-11-01 DIAGNOSIS — M12.812 ROTATOR CUFF ARTHROPATHY OF BOTH SHOULDERS: ICD-10-CM

## 2022-11-01 DIAGNOSIS — M12.811 ROTATOR CUFF ARTHROPATHY OF BOTH SHOULDERS: ICD-10-CM

## 2022-11-01 PROCEDURE — 73200 CT UPPER EXTREMITY W/O DYE: CPT

## 2022-11-01 NOTE — TELEPHONE ENCOUNTER
Procedure: Right Shoulder Replacement    Medication Directive: aspirin    PMH: HTN, CHF-SOA, Cardiomyopathy    Last Seen: 08/16/22    ECHO (08/22/22)    • The left ventricular cavity is borderline dilated.  • Left ventricular ejection fraction appears to be 36 - 40%.  • Left ventricular diastolic function is consistent with (grade I) impaired relaxation.  • No significant valvular disease.

## 2022-11-01 NOTE — TELEPHONE ENCOUNTER
He is clear to proceed at intermediate risk and can hold aspirin for 7 days prior to the procedure.

## 2022-11-03 ENCOUNTER — TELEPHONE (OUTPATIENT)
Dept: ORTHOPEDIC SURGERY | Facility: CLINIC | Age: 62
End: 2022-11-03

## 2022-11-03 NOTE — TELEPHONE ENCOUNTER
CALLED AND LEFT A MESSAGE FOR PATIENT TO CALL ME AT OFFICE.  PER DR HARRIS, PATIENT CAN STOP/HOLD ASPIRIN FOR 7 DAYS PRIOR TO SURGERY.

## 2022-11-09 DIAGNOSIS — Z96.611 AFTERCARE FOLLOWING RIGHT SHOULDER JOINT REPLACEMENT SURGERY: Primary | ICD-10-CM

## 2022-11-09 DIAGNOSIS — Z47.1 AFTERCARE FOLLOWING RIGHT SHOULDER JOINT REPLACEMENT SURGERY: Primary | ICD-10-CM

## 2022-11-23 ENCOUNTER — PRE-ADMISSION TESTING (OUTPATIENT)
Dept: PREADMISSION TESTING | Facility: HOSPITAL | Age: 62
End: 2022-11-23

## 2022-11-23 VITALS
TEMPERATURE: 98 F | OXYGEN SATURATION: 97 % | WEIGHT: 172.18 LBS | BODY MASS INDEX: 28.69 KG/M2 | HEIGHT: 65 IN | HEART RATE: 72 BPM | DIASTOLIC BLOOD PRESSURE: 88 MMHG | SYSTOLIC BLOOD PRESSURE: 132 MMHG

## 2022-11-23 DIAGNOSIS — M12.812 ROTATOR CUFF ARTHROPATHY OF BOTH SHOULDERS: ICD-10-CM

## 2022-11-23 DIAGNOSIS — M12.811 ROTATOR CUFF ARTHROPATHY OF BOTH SHOULDERS: ICD-10-CM

## 2022-11-23 LAB
ALBUMIN SERPL-MCNC: 4.2 G/DL (ref 3.5–5.2)
ALBUMIN/GLOB SERPL: 2 G/DL
ALP SERPL-CCNC: 73 U/L (ref 39–117)
ALT SERPL W P-5'-P-CCNC: 14 U/L (ref 1–41)
ANION GAP SERPL CALCULATED.3IONS-SCNC: 10.6 MMOL/L (ref 5–15)
AST SERPL-CCNC: 15 U/L (ref 1–40)
BASOPHILS # BLD AUTO: 0.06 10*3/MM3 (ref 0–0.2)
BASOPHILS NFR BLD AUTO: 0.5 % (ref 0–1.5)
BILIRUB SERPL-MCNC: 0.2 MG/DL (ref 0–1.2)
BUN SERPL-MCNC: 17 MG/DL (ref 8–23)
BUN/CREAT SERPL: 20 (ref 7–25)
CALCIUM SPEC-SCNC: 9.2 MG/DL (ref 8.6–10.5)
CHLORIDE SERPL-SCNC: 102 MMOL/L (ref 98–107)
CO2 SERPL-SCNC: 25.4 MMOL/L (ref 22–29)
CREAT SERPL-MCNC: 0.85 MG/DL (ref 0.76–1.27)
DEPRECATED RDW RBC AUTO: 45.1 FL (ref 37–54)
EGFRCR SERPLBLD CKD-EPI 2021: 98.2 ML/MIN/1.73
EOSINOPHIL # BLD AUTO: 0 10*3/MM3 (ref 0–0.4)
EOSINOPHIL NFR BLD AUTO: 0 % (ref 0.3–6.2)
ERYTHROCYTE [DISTWIDTH] IN BLOOD BY AUTOMATED COUNT: 13.2 % (ref 12.3–15.4)
GLOBULIN UR ELPH-MCNC: 2.1 GM/DL
GLUCOSE SERPL-MCNC: 87 MG/DL (ref 65–99)
HBA1C MFR BLD: 5.4 % (ref 4.8–5.6)
HCT VFR BLD AUTO: 42.2 % (ref 37.5–51)
HGB BLD-MCNC: 14.8 G/DL (ref 13–17.7)
IMM GRANULOCYTES # BLD AUTO: 0.13 10*3/MM3 (ref 0–0.05)
IMM GRANULOCYTES NFR BLD AUTO: 1.1 % (ref 0–0.5)
INR PPP: 0.99 (ref 0.86–1.15)
LYMPHOCYTES # BLD AUTO: 2.23 10*3/MM3 (ref 0.7–3.1)
LYMPHOCYTES NFR BLD AUTO: 19.6 % (ref 19.6–45.3)
MCH RBC QN AUTO: 32.7 PG (ref 26.6–33)
MCHC RBC AUTO-ENTMCNC: 35.1 G/DL (ref 31.5–35.7)
MCV RBC AUTO: 93.4 FL (ref 79–97)
MONOCYTES # BLD AUTO: 0.46 10*3/MM3 (ref 0.1–0.9)
MONOCYTES NFR BLD AUTO: 4 % (ref 5–12)
NEUTROPHILS NFR BLD AUTO: 74.8 % (ref 42.7–76)
NEUTROPHILS NFR BLD AUTO: 8.52 10*3/MM3 (ref 1.7–7)
NRBC BLD AUTO-RTO: 0 /100 WBC (ref 0–0.2)
PLATELET # BLD AUTO: 229 10*3/MM3 (ref 140–450)
PMV BLD AUTO: 9.6 FL (ref 6–12)
POTASSIUM SERPL-SCNC: 4.3 MMOL/L (ref 3.5–5.2)
PROT SERPL-MCNC: 6.3 G/DL (ref 6–8.5)
PROTHROMBIN TIME: 13.2 SECONDS (ref 11.8–14.9)
RBC # BLD AUTO: 4.52 10*6/MM3 (ref 4.14–5.8)
SODIUM SERPL-SCNC: 138 MMOL/L (ref 136–145)
WBC NRBC COR # BLD: 11.4 10*3/MM3 (ref 3.4–10.8)

## 2022-11-23 PROCEDURE — 83036 HEMOGLOBIN GLYCOSYLATED A1C: CPT

## 2022-11-23 PROCEDURE — 85610 PROTHROMBIN TIME: CPT

## 2022-11-23 PROCEDURE — 80053 COMPREHEN METABOLIC PANEL: CPT

## 2022-11-23 PROCEDURE — 85025 COMPLETE CBC W/AUTO DIFF WBC: CPT

## 2022-11-23 NOTE — DISCHARGE INSTRUCTIONS
IMPORTANT INSTRUCTIONS - PRE-ADMISSION TESTING  DO NOT EAT OR CHEW anything after midnight the night before your procedure.    You may have CLEAR liquids up to 3 hours prior to ARRIVAL time.   Take the following medications the morning of your procedure with JUST A SIP OF WATER:  Carvedilol, Albuterol inhaler, Chlor-Trimeton, Flexeril  DO NOT BRING your medications to the hospital with you, UNLESS something has changed since your PRE-Admission Testing appointment.  Hold all vitamins, supplements, and NSAIDS (Non- steroidal anti-inflammatory meds) for one week prior to surgery (you MAY take Tylenol or Acetaminophen).  Use your inhalers/nebulizers as usual, the morning of your procedure. BRING YOUR INHALERS with you.   Make sure you have a ride home and have someone who will stay with you the day of your procedure after you go home.  If you have any questions, please call your Pre-Admission Testing Nurse, Corina at [phone].   Per anesthesia request, do not smoke for 24 hours before your procedure or as instructed by your surgeon.         Clear Liquid Diet        Find out when you need to start a clear liquid diet.   Think of “clear liquids” as anything you could read a newspaper through. This includes things like water, broth, sports drinks, or tea WITHOUT any kind of milk or cream.           Once you are told to start a clear liquid diet, only drink these things until 3 hours before arrival to the hospital or when the hospital says to stop. Total volume limitation: 8 oz.       Clear liquids you CAN drink:   Water   Clear broth: beef, chicken, vegetable, or bone broth with nothing in it   Gatorade   Lemonade or Ranjeet-aid   Soda   Tea, coffee (NO cream or honey)   Jell-O (without fruit)   Popsicles (without fruit or cream)   Italian ices   Juice without pulp: apple, white, grape   You may use salt, pepper, and sugar    Do NOT drink:   Milk or cream   Soy milk, almond milk, coconut milk, or other non-dairy drinks  and   creamers   Milkshakes or smoothies   Tomato juice   Orange juice   Grapefruit juice   Cream soups or any other than broth         Clear Liquid Diet:  Do NOT eat any solid food.  Do NOT eat or suck on mints or candy.  Do NOT chew gum.  Do NOT drink thick liquids like milk or juice with pulp in it.  Do NOT add milk, cream, or anything like soy milk or almond milk to coffee or tea.        PREOPERATIVE (BEFORE SURGERY)              BATHING INSTRUCTIONS  Instructions:    You will need to shower 3 separate times utilizing the soap provided; at the times indicated   below:     11-29-22 PM   11-30-22 AM   11-30-22 PM      Wash your hair and face with normal shampoo and soap, rinse it well before using the surgical soap.      In the shower, wet the skin completely with water from your neck to your feet. Apply the cleanser to your   body ONLY FROM THE NECK TO YOUR FEET.     Do NOT USE THE CLEANSER ON YOUR FACE, HEAD, OR GENITAL (PRIVATE) AREAS.   Keep it out of your eyes, ears, and mouth because of the risk of injury to those areas.      Scrub with a clean washcloth for each bath utilizing the soap provided from the top of your body to the   bottom starting at the neck area.      Pay close attention to your armpits, groin area, and the site of surgery.      Wash your body gently for 5 minutes. Stand outside the stream or turn off the water while scrubbing your   body. Do NOT wash with your regular soap after the surgical cleanser is used.      RINSE THE CLEANSER OFF COMPLETELY with plenty of water. Rinse the area again thoroughly.      Dry off with a clean towel. The surgical soap can cause dryness; however do NOT APPLY LOTION,   CREAM, POWDER, and/or DEODORANT AFTER SHOWERING.     Be sure to where clean clothes after showering.      Ensure CLEAN BED LINENS AFTER FIRST wash with the surgical soap.      NO PETS ALLOWED IN THE BED with you after utilizing the surgical soap.

## 2022-11-25 ENCOUNTER — ANESTHESIA EVENT (OUTPATIENT)
Dept: PERIOP | Facility: HOSPITAL | Age: 62
End: 2022-11-25

## 2022-11-28 ENCOUNTER — APPOINTMENT (OUTPATIENT)
Dept: ULTRASOUND IMAGING | Facility: HOSPITAL | Age: 62
End: 2022-11-28

## 2022-12-01 ENCOUNTER — HOSPITAL ENCOUNTER (OUTPATIENT)
Facility: HOSPITAL | Age: 62
Discharge: HOME | End: 2022-12-02
Attending: STUDENT IN AN ORGANIZED HEALTH CARE EDUCATION/TRAINING PROGRAM | Admitting: STUDENT IN AN ORGANIZED HEALTH CARE EDUCATION/TRAINING PROGRAM

## 2022-12-01 ENCOUNTER — ANESTHESIA (OUTPATIENT)
Dept: PERIOP | Facility: HOSPITAL | Age: 62
End: 2022-12-01

## 2022-12-01 ENCOUNTER — APPOINTMENT (OUTPATIENT)
Dept: GENERAL RADIOLOGY | Facility: HOSPITAL | Age: 62
End: 2022-12-01

## 2022-12-01 DIAGNOSIS — Z78.9 DECREASED ACTIVITIES OF DAILY LIVING (ADL): ICD-10-CM

## 2022-12-01 DIAGNOSIS — R26.2 DIFFICULTY WALKING: Primary | ICD-10-CM

## 2022-12-01 DIAGNOSIS — M12.812 ROTATOR CUFF ARTHROPATHY OF BOTH SHOULDERS: ICD-10-CM

## 2022-12-01 DIAGNOSIS — M12.811 ROTATOR CUFF ARTHROPATHY OF BOTH SHOULDERS: ICD-10-CM

## 2022-12-01 DIAGNOSIS — Z96.611 STATUS POST REVERSE ARTHROPLASTY OF RIGHT SHOULDER: ICD-10-CM

## 2022-12-01 PROCEDURE — 25010000002 FENTANYL CITRATE (PF) 50 MCG/ML SOLUTION: Performed by: NURSE ANESTHETIST, CERTIFIED REGISTERED

## 2022-12-01 PROCEDURE — A9270 NON-COVERED ITEM OR SERVICE: HCPCS | Performed by: INTERNAL MEDICINE

## 2022-12-01 PROCEDURE — A9270 NON-COVERED ITEM OR SERVICE: HCPCS | Performed by: ANESTHESIOLOGY

## 2022-12-01 PROCEDURE — 63710000001 ACETAMINOPHEN 500 MG TABLET: Performed by: ANESTHESIOLOGY

## 2022-12-01 PROCEDURE — 25010000002 ONDANSETRON PER 1 MG: Performed by: NURSE ANESTHETIST, CERTIFIED REGISTERED

## 2022-12-01 PROCEDURE — 25010000002 ROPIVACAINE PER 1 MG: Performed by: ANESTHESIOLOGY

## 2022-12-01 PROCEDURE — 63710000001 CARVEDILOL 12.5 MG TABLET: Performed by: INTERNAL MEDICINE

## 2022-12-01 PROCEDURE — 25010000002 MIDAZOLAM PER 1 MG: Performed by: ANESTHESIOLOGY

## 2022-12-01 PROCEDURE — A9270 NON-COVERED ITEM OR SERVICE: HCPCS | Performed by: STUDENT IN AN ORGANIZED HEALTH CARE EDUCATION/TRAINING PROGRAM

## 2022-12-01 PROCEDURE — S0260 H&P FOR SURGERY: HCPCS | Performed by: STUDENT IN AN ORGANIZED HEALTH CARE EDUCATION/TRAINING PROGRAM

## 2022-12-01 PROCEDURE — 63710000001 FAMOTIDINE 20 MG TABLET: Performed by: STUDENT IN AN ORGANIZED HEALTH CARE EDUCATION/TRAINING PROGRAM

## 2022-12-01 PROCEDURE — C1776 JOINT DEVICE (IMPLANTABLE): HCPCS | Performed by: STUDENT IN AN ORGANIZED HEALTH CARE EDUCATION/TRAINING PROGRAM

## 2022-12-01 PROCEDURE — 63710000001 FERROUS SULFATE 325 (65 FE) MG TABLET: Performed by: STUDENT IN AN ORGANIZED HEALTH CARE EDUCATION/TRAINING PROGRAM

## 2022-12-01 PROCEDURE — S0260 H&P FOR SURGERY: HCPCS | Performed by: PHYSICIAN ASSISTANT

## 2022-12-01 PROCEDURE — 25010000002 CEFAZOLIN IN DEXTROSE 2-4 GM/100ML-% SOLUTION: Performed by: STUDENT IN AN ORGANIZED HEALTH CARE EDUCATION/TRAINING PROGRAM

## 2022-12-01 PROCEDURE — 99203 OFFICE O/P NEW LOW 30 MIN: CPT | Performed by: INTERNAL MEDICINE

## 2022-12-01 PROCEDURE — 63710000001 SENNOSIDES-DOCUSATE 8.6-50 MG TABLET: Performed by: STUDENT IN AN ORGANIZED HEALTH CARE EDUCATION/TRAINING PROGRAM

## 2022-12-01 PROCEDURE — 94799 UNLISTED PULMONARY SVC/PX: CPT

## 2022-12-01 PROCEDURE — 25010000002 DEXAMETHASONE PER 1 MG: Performed by: NURSE ANESTHETIST, CERTIFIED REGISTERED

## 2022-12-01 PROCEDURE — 97161 PT EVAL LOW COMPLEX 20 MIN: CPT

## 2022-12-01 PROCEDURE — 73020 X-RAY EXAM OF SHOULDER: CPT

## 2022-12-01 PROCEDURE — 23472 RECONSTRUCT SHOULDER JOINT: CPT | Performed by: STUDENT IN AN ORGANIZED HEALTH CARE EDUCATION/TRAINING PROGRAM

## 2022-12-01 PROCEDURE — 23472 RECONSTRUCT SHOULDER JOINT: CPT | Performed by: PHYSICIAN ASSISTANT

## 2022-12-01 PROCEDURE — 25010000002 PROPOFOL 10 MG/ML EMULSION: Performed by: NURSE ANESTHETIST, CERTIFIED REGISTERED

## 2022-12-01 PROCEDURE — 76942 ECHO GUIDE FOR BIOPSY: CPT | Performed by: STUDENT IN AN ORGANIZED HEALTH CARE EDUCATION/TRAINING PROGRAM

## 2022-12-01 DEVICE — SCRW FIX LK HEX 4.75X20MM: Type: IMPLANTABLE DEVICE | Site: SHOULDER | Status: FUNCTIONAL

## 2022-12-01 DEVICE — BASEPLT GLEN COMPR MINI W TPR ADAPTR 25: Type: IMPLANTABLE DEVICE | Site: SHOULDER | Status: FUNCTIONAL

## 2022-12-01 DEVICE — GLENOSPHERE VERSA DIAL FIX STD 36MM: Type: IMPLANTABLE DEVICE | Site: SHOULDER | Status: FUNCTIONAL

## 2022-12-01 DEVICE — SCRW COMPRNSV CNTRL 6.5X30MM REUS: Type: IMPLANTABLE DEVICE | Site: SHOULDER | Status: FUNCTIONAL

## 2022-12-01 DEVICE — SUT FW #2 W/TPR NDL 1/2 CIR 38IN 97CM 26.5MM BLU: Type: IMPLANTABLE DEVICE | Site: SHOULDER | Status: FUNCTIONAL

## 2022-12-01 DEVICE — STEM HUM/SHLDR COMPREHENSIVE MINI 11X83MM: Type: IMPLANTABLE DEVICE | Site: SHOULDER | Status: FUNCTIONAL

## 2022-12-01 DEVICE — BEAR HUM PROLNG PLS3 36MM: Type: IMPLANTABLE DEVICE | Site: SHOULDER | Status: FUNCTIONAL

## 2022-12-01 DEVICE — SCRW FIX LK HEX 4.75X15MM: Type: IMPLANTABLE DEVICE | Site: SHOULDER | Status: FUNCTIONAL

## 2022-12-01 DEVICE — TOTL SHLDER REV: Type: IMPLANTABLE DEVICE | Site: SHOULDER | Status: FUNCTIONAL

## 2022-12-01 DEVICE — TRY HUM/SHLDR COMPREHENSIVE/REVERSE MINI COCR STD PLS3MM 40M: Type: IMPLANTABLE DEVICE | Site: SHOULDER | Status: FUNCTIONAL

## 2022-12-01 RX ORDER — DEXAMETHASONE SODIUM PHOSPHATE 4 MG/ML
INJECTION, SOLUTION INTRA-ARTICULAR; INTRALESIONAL; INTRAMUSCULAR; INTRAVENOUS; SOFT TISSUE AS NEEDED
Status: DISCONTINUED | OUTPATIENT
Start: 2022-12-01 | End: 2022-12-01 | Stop reason: SURG

## 2022-12-01 RX ORDER — MAGNESIUM HYDROXIDE 1200 MG/15ML
LIQUID ORAL AS NEEDED
Status: DISCONTINUED | OUTPATIENT
Start: 2022-12-01 | End: 2022-12-01 | Stop reason: HOSPADM

## 2022-12-01 RX ORDER — FENTANYL CITRATE 50 UG/ML
INJECTION, SOLUTION INTRAMUSCULAR; INTRAVENOUS AS NEEDED
Status: DISCONTINUED | OUTPATIENT
Start: 2022-12-01 | End: 2022-12-01 | Stop reason: SURG

## 2022-12-01 RX ORDER — ACETAMINOPHEN 325 MG/1
325 TABLET ORAL EVERY 4 HOURS PRN
Status: DISCONTINUED | OUTPATIENT
Start: 2022-12-01 | End: 2022-12-02 | Stop reason: HOSPADM

## 2022-12-01 RX ORDER — NALOXONE HCL 0.4 MG/ML
0.4 VIAL (ML) INJECTION
Status: DISCONTINUED | OUTPATIENT
Start: 2022-12-01 | End: 2022-12-02 | Stop reason: HOSPADM

## 2022-12-01 RX ORDER — FERROUS SULFATE 325(65) MG
325 TABLET ORAL
Status: DISCONTINUED | OUTPATIENT
Start: 2022-12-01 | End: 2022-12-02 | Stop reason: HOSPADM

## 2022-12-01 RX ORDER — ACETAMINOPHEN 325 MG/1
650 TABLET ORAL EVERY 4 HOURS PRN
Status: DISCONTINUED | OUTPATIENT
Start: 2022-12-01 | End: 2022-12-02 | Stop reason: HOSPADM

## 2022-12-01 RX ORDER — SODIUM CHLORIDE, SODIUM LACTATE, POTASSIUM CHLORIDE, CALCIUM CHLORIDE 600; 310; 30; 20 MG/100ML; MG/100ML; MG/100ML; MG/100ML
9 INJECTION, SOLUTION INTRAVENOUS CONTINUOUS PRN
Status: DISCONTINUED | OUTPATIENT
Start: 2022-12-01 | End: 2022-12-01

## 2022-12-01 RX ORDER — PROMETHAZINE HYDROCHLORIDE 25 MG/1
25 SUPPOSITORY RECTAL ONCE AS NEEDED
Status: DISCONTINUED | OUTPATIENT
Start: 2022-12-01 | End: 2022-12-01 | Stop reason: HOSPADM

## 2022-12-01 RX ORDER — CARVEDILOL 12.5 MG/1
12.5 TABLET ORAL 2 TIMES DAILY
Status: DISCONTINUED | OUTPATIENT
Start: 2022-12-01 | End: 2022-12-02 | Stop reason: HOSPADM

## 2022-12-01 RX ORDER — SODIUM CHLORIDE, SODIUM LACTATE, POTASSIUM CHLORIDE, CALCIUM CHLORIDE 600; 310; 30; 20 MG/100ML; MG/100ML; MG/100ML; MG/100ML
100 INJECTION, SOLUTION INTRAVENOUS CONTINUOUS
Status: DISCONTINUED | OUTPATIENT
Start: 2022-12-01 | End: 2022-12-02 | Stop reason: HOSPADM

## 2022-12-01 RX ORDER — ROPIVACAINE HYDROCHLORIDE 5 MG/ML
INJECTION, SOLUTION EPIDURAL; INFILTRATION; PERINEURAL
Status: COMPLETED | OUTPATIENT
Start: 2022-12-01 | End: 2022-12-01

## 2022-12-01 RX ORDER — PROMETHAZINE HYDROCHLORIDE 12.5 MG/1
25 TABLET ORAL ONCE AS NEEDED
Status: DISCONTINUED | OUTPATIENT
Start: 2022-12-01 | End: 2022-12-01 | Stop reason: HOSPADM

## 2022-12-01 RX ORDER — LIDOCAINE HYDROCHLORIDE 20 MG/ML
INJECTION, SOLUTION EPIDURAL; INFILTRATION; INTRACAUDAL; PERINEURAL AS NEEDED
Status: DISCONTINUED | OUTPATIENT
Start: 2022-12-01 | End: 2022-12-01 | Stop reason: SURG

## 2022-12-01 RX ORDER — ROCURONIUM BROMIDE 10 MG/ML
INJECTION, SOLUTION INTRAVENOUS AS NEEDED
Status: DISCONTINUED | OUTPATIENT
Start: 2022-12-01 | End: 2022-12-01 | Stop reason: SURG

## 2022-12-01 RX ORDER — PROMETHAZINE HYDROCHLORIDE 12.5 MG/1
12.5 TABLET ORAL EVERY 6 HOURS PRN
Status: DISCONTINUED | OUTPATIENT
Start: 2022-12-01 | End: 2022-12-02 | Stop reason: HOSPADM

## 2022-12-01 RX ORDER — CEFAZOLIN SODIUM 2 G/100ML
2 INJECTION, SOLUTION INTRAVENOUS ONCE
Status: COMPLETED | OUTPATIENT
Start: 2022-12-01 | End: 2022-12-01

## 2022-12-01 RX ORDER — TRANEXAMIC ACID 10 MG/ML
1000 INJECTION, SOLUTION INTRAVENOUS ONCE
Status: DISCONTINUED | OUTPATIENT
Start: 2022-12-01 | End: 2022-12-01 | Stop reason: HOSPADM

## 2022-12-01 RX ORDER — ASPIRIN 325 MG
325 TABLET, DELAYED RELEASE (ENTERIC COATED) ORAL DAILY
Status: DISCONTINUED | OUTPATIENT
Start: 2022-12-02 | End: 2022-12-02 | Stop reason: HOSPADM

## 2022-12-01 RX ORDER — PROPOFOL 10 MG/ML
VIAL (ML) INTRAVENOUS AS NEEDED
Status: DISCONTINUED | OUTPATIENT
Start: 2022-12-01 | End: 2022-12-01 | Stop reason: SURG

## 2022-12-01 RX ORDER — ONDANSETRON 2 MG/ML
4 INJECTION INTRAMUSCULAR; INTRAVENOUS ONCE AS NEEDED
Status: DISCONTINUED | OUTPATIENT
Start: 2022-12-01 | End: 2022-12-01 | Stop reason: HOSPADM

## 2022-12-01 RX ORDER — LISINOPRIL 20 MG/1
40 TABLET ORAL
Status: DISCONTINUED | OUTPATIENT
Start: 2022-12-02 | End: 2022-12-02 | Stop reason: HOSPADM

## 2022-12-01 RX ORDER — MEPERIDINE HYDROCHLORIDE 25 MG/ML
12.5 INJECTION INTRAMUSCULAR; INTRAVENOUS; SUBCUTANEOUS
Status: DISCONTINUED | OUTPATIENT
Start: 2022-12-01 | End: 2022-12-01 | Stop reason: HOSPADM

## 2022-12-01 RX ORDER — FAMOTIDINE 20 MG/1
40 TABLET, FILM COATED ORAL DAILY
Status: DISCONTINUED | OUTPATIENT
Start: 2022-12-01 | End: 2022-12-02 | Stop reason: HOSPADM

## 2022-12-01 RX ORDER — CEFAZOLIN SODIUM 2 G/100ML
2 INJECTION, SOLUTION INTRAVENOUS EVERY 8 HOURS
Status: COMPLETED | OUTPATIENT
Start: 2022-12-01 | End: 2022-12-02

## 2022-12-01 RX ORDER — CEFAZOLIN SODIUM IN 0.9 % NACL 3 G/100 ML
3 INTRAVENOUS SOLUTION, PIGGYBACK (ML) INTRAVENOUS ONCE
Status: DISCONTINUED | OUTPATIENT
Start: 2022-12-01 | End: 2022-12-01 | Stop reason: DRUGHIGH

## 2022-12-01 RX ORDER — MIDAZOLAM HYDROCHLORIDE 1 MG/ML
2 INJECTION INTRAMUSCULAR; INTRAVENOUS ONCE
Status: COMPLETED | OUTPATIENT
Start: 2022-12-01 | End: 2022-12-01

## 2022-12-01 RX ORDER — OXYCODONE HYDROCHLORIDE 5 MG/1
5 TABLET ORAL
Status: DISCONTINUED | OUTPATIENT
Start: 2022-12-01 | End: 2022-12-01 | Stop reason: HOSPADM

## 2022-12-01 RX ORDER — FENTANYL CITRATE 50 UG/ML
INJECTION, SOLUTION INTRAMUSCULAR; INTRAVENOUS AS NEEDED
Status: DISCONTINUED | OUTPATIENT
Start: 2022-12-01 | End: 2022-12-01

## 2022-12-01 RX ORDER — AMOXICILLIN 250 MG
2 CAPSULE ORAL 2 TIMES DAILY
Status: DISCONTINUED | OUTPATIENT
Start: 2022-12-01 | End: 2022-12-02 | Stop reason: HOSPADM

## 2022-12-01 RX ORDER — TRANEXAMIC ACID 10 MG/ML
1000 INJECTION, SOLUTION INTRAVENOUS ONCE
Status: COMPLETED | OUTPATIENT
Start: 2022-12-01 | End: 2022-12-02

## 2022-12-01 RX ORDER — OXYCODONE HYDROCHLORIDE AND ACETAMINOPHEN 5; 325 MG/1; MG/1
2 TABLET ORAL EVERY 4 HOURS PRN
Status: DISCONTINUED | OUTPATIENT
Start: 2022-12-01 | End: 2022-12-02 | Stop reason: HOSPADM

## 2022-12-01 RX ORDER — ACETAMINOPHEN 650 MG/1
650 SUPPOSITORY RECTAL EVERY 4 HOURS PRN
Status: DISCONTINUED | OUTPATIENT
Start: 2022-12-01 | End: 2022-12-02 | Stop reason: HOSPADM

## 2022-12-01 RX ORDER — SUCCINYLCHOLINE/SOD CL,ISO/PF 100 MG/5ML
SYRINGE (ML) INTRAVENOUS AS NEEDED
Status: DISCONTINUED | OUTPATIENT
Start: 2022-12-01 | End: 2022-12-01 | Stop reason: SURG

## 2022-12-01 RX ORDER — ONDANSETRON 2 MG/ML
INJECTION INTRAMUSCULAR; INTRAVENOUS AS NEEDED
Status: DISCONTINUED | OUTPATIENT
Start: 2022-12-01 | End: 2022-12-01 | Stop reason: SURG

## 2022-12-01 RX ORDER — ALBUTEROL SULFATE 2.5 MG/3ML
2.5 SOLUTION RESPIRATORY (INHALATION) EVERY 6 HOURS PRN
Status: DISCONTINUED | OUTPATIENT
Start: 2022-12-01 | End: 2022-12-02 | Stop reason: HOSPADM

## 2022-12-01 RX ORDER — ACETAMINOPHEN 500 MG
1000 TABLET ORAL ONCE
Status: COMPLETED | OUTPATIENT
Start: 2022-12-01 | End: 2022-12-01

## 2022-12-01 RX ORDER — KETOROLAC TROMETHAMINE 15 MG/ML
15 INJECTION, SOLUTION INTRAMUSCULAR; INTRAVENOUS EVERY 6 HOURS PRN
Status: DISCONTINUED | OUTPATIENT
Start: 2022-12-01 | End: 2022-12-02 | Stop reason: HOSPADM

## 2022-12-01 RX ORDER — POVIDONE-IODINE 10 MG/ML
SOLUTION TOPICAL ONCE
Status: COMPLETED | OUTPATIENT
Start: 2022-12-01 | End: 2022-12-01

## 2022-12-01 RX ORDER — MORPHINE SULFATE 2 MG/ML
2 INJECTION, SOLUTION INTRAMUSCULAR; INTRAVENOUS
Status: DISCONTINUED | OUTPATIENT
Start: 2022-12-01 | End: 2022-12-02 | Stop reason: HOSPADM

## 2022-12-01 RX ORDER — OXYCODONE HYDROCHLORIDE AND ACETAMINOPHEN 5; 325 MG/1; MG/1
1 TABLET ORAL EVERY 4 HOURS PRN
Status: DISCONTINUED | OUTPATIENT
Start: 2022-12-01 | End: 2022-12-02 | Stop reason: HOSPADM

## 2022-12-01 RX ORDER — PROMETHAZINE HYDROCHLORIDE 12.5 MG/1
12.5 SUPPOSITORY RECTAL EVERY 6 HOURS PRN
Status: DISCONTINUED | OUTPATIENT
Start: 2022-12-01 | End: 2022-12-02 | Stop reason: HOSPADM

## 2022-12-01 RX ADMIN — ROCURONIUM BROMIDE 45 MG: 10 INJECTION INTRAVENOUS at 09:09

## 2022-12-01 RX ADMIN — CEFAZOLIN SODIUM 2 G: 2 INJECTION, SOLUTION INTRAVENOUS at 08:45

## 2022-12-01 RX ADMIN — POVIDONE-IODINE 1 EACH: 10 SOLUTION TOPICAL at 06:56

## 2022-12-01 RX ADMIN — Medication 80 MG: at 08:51

## 2022-12-01 RX ADMIN — SODIUM CHLORIDE, POTASSIUM CHLORIDE, SODIUM LACTATE AND CALCIUM CHLORIDE 100 ML/HR: 600; 310; 30; 20 INJECTION, SOLUTION INTRAVENOUS at 13:07

## 2022-12-01 RX ADMIN — SODIUM CHLORIDE, POTASSIUM CHLORIDE, SODIUM LACTATE AND CALCIUM CHLORIDE 9 ML/HR: 600; 310; 30; 20 INJECTION, SOLUTION INTRAVENOUS at 07:32

## 2022-12-01 RX ADMIN — LIDOCAINE HYDROCHLORIDE 100 MG: 20 INJECTION, SOLUTION EPIDURAL; INFILTRATION; INTRACAUDAL; PERINEURAL at 08:51

## 2022-12-01 RX ADMIN — SENNOSIDES AND DOCUSATE SODIUM 2 TABLET: 8.6; 5 TABLET ORAL at 21:42

## 2022-12-01 RX ADMIN — ACETAMINOPHEN 1000 MG: 500 TABLET ORAL at 07:32

## 2022-12-01 RX ADMIN — ROCURONIUM BROMIDE 5 MG: 10 INJECTION INTRAVENOUS at 08:51

## 2022-12-01 RX ADMIN — DEXAMETHASONE SODIUM PHOSPHATE 4 MG: 4 INJECTION, SOLUTION INTRA-ARTICULAR; INTRALESIONAL; INTRAMUSCULAR; INTRAVENOUS; SOFT TISSUE at 08:46

## 2022-12-01 RX ADMIN — FERROUS SULFATE TAB 325 MG (65 MG ELEMENTAL FE) 325 MG: 325 (65 FE) TAB at 13:06

## 2022-12-01 RX ADMIN — PROPOFOL 150 MG: 10 INJECTION, EMULSION INTRAVENOUS at 08:51

## 2022-12-01 RX ADMIN — MIDAZOLAM HYDROCHLORIDE 2 MG: 1 INJECTION, SOLUTION INTRAMUSCULAR; INTRAVENOUS at 07:33

## 2022-12-01 RX ADMIN — SODIUM CHLORIDE, POTASSIUM CHLORIDE, SODIUM LACTATE AND CALCIUM CHLORIDE: 600; 310; 30; 20 INJECTION, SOLUTION INTRAVENOUS at 10:28

## 2022-12-01 RX ADMIN — SENNOSIDES AND DOCUSATE SODIUM 2 TABLET: 8.6; 5 TABLET ORAL at 13:06

## 2022-12-01 RX ADMIN — TRANEXAMIC ACID 1000 MG: 10 INJECTION, SOLUTION INTRAVENOUS at 07:33

## 2022-12-01 RX ADMIN — FAMOTIDINE 40 MG: 20 TABLET, FILM COATED ORAL at 13:06

## 2022-12-01 RX ADMIN — ONDANSETRON 4 MG: 2 INJECTION INTRAMUSCULAR; INTRAVENOUS at 10:16

## 2022-12-01 RX ADMIN — SUGAMMADEX 200 MG: 100 INJECTION, SOLUTION INTRAVENOUS at 10:16

## 2022-12-01 RX ADMIN — ROPIVACAINE HYDROCHLORIDE 30 ML: 5 INJECTION, SOLUTION EPIDURAL; INFILTRATION; PERINEURAL at 07:49

## 2022-12-01 RX ADMIN — TRANEXAMIC ACID 1000 MG: 100 INJECTION, SOLUTION INTRAVENOUS at 10:12

## 2022-12-01 RX ADMIN — CEFAZOLIN SODIUM 2 G: 2 INJECTION, SOLUTION INTRAVENOUS at 17:42

## 2022-12-01 RX ADMIN — CARVEDILOL 12.5 MG: 12.5 TABLET, FILM COATED ORAL at 21:42

## 2022-12-01 RX ADMIN — FENTANYL CITRATE 50 MCG: 50 INJECTION, SOLUTION INTRAMUSCULAR; INTRAVENOUS at 09:54

## 2022-12-01 RX ADMIN — SODIUM CHLORIDE, POTASSIUM CHLORIDE, SODIUM LACTATE AND CALCIUM CHLORIDE 100 ML/HR: 600; 310; 30; 20 INJECTION, SOLUTION INTRAVENOUS at 21:34

## 2022-12-01 NOTE — ANESTHESIA POSTPROCEDURE EVALUATION
Patient: Harjinder Schneider    Procedure Summary     Date: 12/01/22 Room / Location: [facility] OR 06 / [facility] MAIN OR    Anesthesia Start: 0845 Anesthesia Stop: 1029    Procedure: TOTAL SHOULDER REVERSE ARTHROPLASTY (Right: Shoulder) Diagnosis:       Rotator cuff arthropathy of both shoulders      (Rotator cuff arthropathy of both shoulders [M12.811, M12.812])    Surgeons: Vadim Rooney MD Provider: Archie Jacobs MD    Anesthesia Type: general ASA Status: 2          Anesthesia Type: general    Vitals  Vitals Value Taken Time   /76 12/01/22 1107   Temp 36.2 °C (97.1 °F) 12/01/22 1100   Pulse 61 12/01/22 1110   Resp 16 12/01/22 1105   SpO2 97 % 12/01/22 1110   Vitals shown include unvalidated device data.        Post Anesthesia Care and Evaluation    Patient location during evaluation: bedside  Patient participation: complete - patient participated  Level of consciousness: awake  Pain management: adequate    Airway patency: patent  Anesthetic complications: No anesthetic complications  PONV Status: none  Cardiovascular status: acceptable and stable  Respiratory status: acceptable  Hydration status: acceptable    Comments: An Anesthesiologist personally participated in the most demanding procedures (including induction and emergence if applicable) in the anesthesia plan, monitored the course of anesthesia administration at frequent intervals and remained physically present and available for immediate diagnosis and treatment of emergencies.

## 2022-12-01 NOTE — CONSULTS
Discharge Planning Assessment   Joseph     Patient Name: Harjinder Schneider  MRN: [id number]  Today's Date: 12/1/2022    Admit Date: 12/1/2022    Plan: RN CM met with patient and patient spouse, Sheron. Patient is status post right total shoulder replacement. CM role discussed and facesheet reviewed. Mr. Schneider reports he is IADL at home and denies DME need at this time. Patient reports daughter and adult armando live in the home as well. Patient will be following up with outpatient therapy, ANKIT Keller on 12/7/22 at 10:30am. CM to follow.   Discharge Needs Assessment     Row Name 12/01/22 1236       Living Environment    People in Home spouse;child(darrick), adult;grandchild(darrick)    Current Living Arrangements home    Primary Care Provided by self    Provides Primary Care For no one    Family Caregiver if Needed spouse;child(darrick), adult;grandchild(darrick), adult    Quality of Family Relationships helpful;involved;supportive    Able to Return to Prior Arrangements yes       Resource/Environmental Concerns    Resource/Environmental Concerns none    Transportation Concerns none       Transition Planning    Patient/Family Anticipates Transition to home with family    Patient/Family Anticipated Services at Transition rehabilitation services    Transportation Anticipated family or friend will provide       Discharge Needs Assessment    Readmission Within the Last 30 Days no previous admission in last 30 days    Equipment Currently Used at Home none    Concerns to be Addressed discharge planning    Anticipated Changes Related to Illness none    Equipment Needed After Discharge none    Outpatient/Agency/Support Group Needs outpatient therapy    Discharge Facility/Level of Care Needs outpatient therapy               Discharge Plan     Row Name 12/01/22 1239       Plan    Plan RN CM met with patient and patient spouse, Sheron. Patient is status post right total shoulder replacement. HU role discussed and facesheet reviewed. Mr. Schneider  reports he is IADL at home and denies DME need at this time. Patient reports daughter and adult armando live in the home as well. Patient will be following up with outpatient therapy, ANK[facility] Keller on 12/7/22 at 10:30am. CM to follow.              Continued Care and Services - Admitted Since 12/1/2022    Coordination has not been started for this encounter.       Expected Discharge Date and Time     Expected Discharge Date Expected Discharge Time    Dec 2, 2022          Demographic Summary     Row Name 12/01/22 1233       General Information    Admission Type inpatient    Arrived From PACU/recovery room    Reason for Consult discharge planning    Preferred Language English       Contact Information    Permission Granted to Share Info With family/designee               Functional Status     Row Name 12/01/22 1233       Functional Status    Usual Activity Tolerance good    Current Activity Tolerance good       Assessment of Health Literacy    How often do you have someone help you read hospital materials? Occasionally    How often do you have problems learning about your medical condition because of difficulty understanding written information? Never    How often do you have a problem understanding what is told to you about your medical condition? Never    How confident are you filling out medical forms by yourself? Quite a bit    Health Literacy Good       Functional Status, IADL    Medications independent    Meal Preparation independent    Housekeeping independent    Laundry independent    Shopping independent       Mental Status    General Appearance WDL WDL       Mental Status Summary    Recent Changes in Mental Status/Cognitive Functioning no changes               Psychosocial    No documentation.                Abuse/Neglect    No documentation.                Legal    No documentation.                Substance Abuse    No documentation.                Patient Forms    No documentation.                    Erica Resendiz RN

## 2022-12-01 NOTE — H&P
[facility]   HOSPITALIST HISTORY AND PHYSICAL  Date: 2022   Patient Name: Harjinder Schneider  : 1960  MRN: [id number]  Primary Care Physician:  Giuliana Crowe APRN  Date of admission: 2022    Subjective   Subjective     Chief Complaint: Consult for postoperative management of medical conditions including hypertension, COPD    HPI:    Harjinder Schneider is a 62 y.o. male with with hypertension, COPD not on home oxygen and managed with albuterol inhaler as needed, ischemic cardiomyopathy and right shoulder rotator cuff arthropathy who underwent elective right total shoulder replacement on 2022.  Hospitalist service was consulted postoperatively for management of medical conditions including hypertension and COPD.  Blood pressure is well controlled on Coreg 12.5 mg twice daily and lisinopril 40 mg daily.  Patient was doing well following the surgery.  Breathing comfortably on 2 L of oxygen.  Tolerating liquids.  No difficulty breathing, chest pain, nausea vomiting abdominal pain.  Ambulated to the bathroom without issue.      Personal History     Past Medical History:  Essential hypertension  Chronic obstructive pulmonary disease  Nonischemic cardiomyopathy  Scoliosis  Arthritis    Past Surgical History:  Past Surgical History:   Procedure Laterality Date   • HAND SURGERY Right     KNUCKLE REPLACEMENT( OP AND REOP) AND THUMB RECONSTRUCTION   • HERNIA REPAIR Left 2021    INGUINAL   • INGUINAL HERNIA REPAIR Bilateral 2022    Procedure: Robotic Bilateral Inguinal Hernia Repair with Mesh Placement;  Surgeon: Josh Aguilar MD;  [facility]: [facility];  Service: [facility] - [facility];  Laterality: Bilateral;   • ROTATOR CUFF REPAIR Bilateral        Family History:   Family History   Problem Relation Age of Onset   • Arthritis Mother    • Thyroid disease Mother    • Stroke Mother    • Hypertension Mother    • Heart disease Father    • Crohn's disease Father    • Malig Hyperthermia Neg Hx         Social History:   Social History     Socioeconomic History   • Marital status:    Tobacco Use   • Smoking status: Former     Packs/day: 1.00     Types: Cigarettes     Quit date:      Years since quittin.9   • Smokeless tobacco: Never   Vaping Use   • Vaping Use: Never used   Substance and Sexual Activity   • Alcohol use: Yes     Comment: occasionally   • Drug use: Never   • Sexual activity: Defer       Home Medications:  albuterol sulfate HFA, aspirin, carvedilol, chlorpheniramine, cyclobenzaprine, diclofenac, and lisinopril    Allergies:  Allergies   Allergen Reactions   • Penicillins Unknown - Low Severity     CHILDHOOD ALLERGY UNKNOWN REACTION BY PT       Review of Systems  Constitutional:  No Fever, No Chills, +Fatigue  HEENT:  Denied complaints  Cardiovascular: Denied complaints  Respiratory:  No Cough, No Dyspnea,  Gastrointestinal:  No Nausea, No Vomiting  Genitourinary:  Denied complaints  Musculoskeletal:  + Right shoulder/arm numbness  Neuro:  Denied complaints  Heme/Lymph:  Denied complaints    Objective   Objective     Vitals:   Temp:  [96.6 °F (35.9 °C)-97.4 °F (36.3 °C)] 97.3 °F (36.3 °C)  Heart Rate:  [57-74] 57  Resp:  [16-18] 16  BP: (111-154)/(66-97) 115/66  Flow (L/min):  [2-3] 2    Physical Exam    Constitutional:  male, sitting in chair, conversant, NAD   Eyes: Pupils equal and reactive, no conjunctival injection   HENT: NCAT, nares patent, mucous membranes moist   Neck: Supple, trachea midline, no JVD   Respiratory: Equal and clear to auscultation bilaterally, nonlabored respirations    Cardiovascular: RRR, no murmurs, no pedal edema   Gastrointestinal: Positive bowel sounds, soft, nontender, nondistended   Musculoskeletal: Right upper extremity in immobilizer no clubbing or cyanosis to extremities   Psychiatric: Appropriate affect, cooperative   Neurologic: Oriented x 3, Cranial Nerves grossly intact, speech clear   Skin: Warm and dry, no rashes or open wounds      Result Review    Result Review:  I have personally reviewed the results from the time of this admission to 12/1/2022 11:59 EST and agree with these findings:  []  Laboratory  []  Microbiology  [x]  Radiology   XR Shoulder 1 View Right    Result Date: 12/1/2022  PROCEDURE: XR SHOULDER 1 VW RIGHT  COMPARISON: [facility] , [place], XR SHOULDER 2+ VW RIGHT, 8/05/2022, 9:23.  INDICATIONS: Post op reverse total right shoulder arthroplasty  FINDINGS:  Two views of the right shoulder demonstrate reverse type prosthesis in place.  No fractures are visualized.  Skin clips are evident.  Subsegmental atelectasis is seen at the right lung base.        Two views the right shoulder demonstrating reverse type prosthesis in place.      KINA PACHECO MD       Electronically Signed and Approved By: KINA PACHECO MD on 12/01/2022 at 12:12             CT shoulder right wo contrast    Result Date: 11/2/2022  PROCEDURE: CT SHOULDER RIGHT WO CONTRAST  COMPARISON: None  INDICATIONS: Shoulder pain, chronic, rheumatoid arthritis suspected  TECHNIQUE: After obtaining the patient's consent, multi-planar CT images of the extremity were created without non-ionic intravenous contrast.   PROTOCOL:   Standard imaging protocol performed    RADIATION:   DLP: 298mGy*cm   Automated exposure control was utilized to minimize radiation dose.  FINDINGS:  No fracture is identified.  The humeral head is subluxed superiorly 1.1 cm.  There are apparent defects in the humeral head and neck likely related to previous surgical intervention.  Mild acromioclavicular osteoarthritis is noted.  No rotator cuff muscle body atrophy is seen.  The supraspinatus and infra spinatus tendons appear somewhat attenuated, difficult to evaluate without intra-articular contrast.  No glenohumeral joint effusion is seen.  No loose body is evident.  No erosive change is seen in the acromioclavicular or glenohumeral joints.         1. Superior humeral head subluxation of 1.1  cm. 2. Parent mild attenuation of the supraspinatus and infra spinatus tendons, not optimally evaluated on this exam 3. No evidence of erosive arthropathy 4. Mild acromioclavicular osteoarthritis 5. Postsurgical changes in the humeral head and neck.     Jose Luis Velez M.D.       Electronically Signed and Approved By: Jose Luis Velez M.D. on 11/02/2022 at 16:51             []  EKG/Telemetry   []  Cardiology/Vascular   []  Pathology  []  Old records  []  Other:      Assessment & Plan   Assessment / Plan     Assessment:  Active Hospital Problems    Diagnosis  POA   • **Status post reverse arthroplasty of right shoulder [Z96.611]  Not Applicable   • Hypertension [I10]  Yes   • Rotator cuff arthropathy of both shoulders [M12.811, M12.812]  Unknown     Added automatically from request for surgery [id number]     • Arthritis of right shoulder region [M19.011]  Yes   • Chronic obstructive lung disease (HCC) [J44.9]  Yes       Plan:     Orthopedics following, POD #0 right shoulder arthroplasty  Complete preprocedural IV cefazolin  continue care with as needed PO/IV narcotics, fluids,  antiemetics, bowel regimen per orders  Wean supplemental oxygen to maintain O2 saturation greater than 90%  encourage incentive spirometry.  Albuterol nebs every 6 hours as needed  Restart Coreg 12.5 mg twice daily and lisinopril 40 mg  PT/OT evaluation  DVT prophylaxis per orthopedic surgery  H/H, BMP in a.m.  Disposition: Likely home with outpatient PT 12/2     DVT prophylaxis:  Mechanical DVT prophylaxis orders are present.    CODE STATUS:    Code Status (Patient has no pulse and is not breathing): CPR (Attempt to Resuscitate)  Medical Interventions (Patient has pulse or is breathing): Full Support  Release to patient: Routine Release      Admission Status:  I believe this patient meets OUTPATIENT status.    Electronically signed by Ric Purdy DO, 12/01/22, 11:59 AM EST.

## 2022-12-01 NOTE — PLAN OF CARE
Goal Outcome Evaluation:  Plan of Care Reviewed With: patient, spouse        Progress: no change  Outcome Evaluation: Patient not appropriate for inpatient PT services at this time as he has not had a significant decline in functional mobility. Recommend follow-up with outpatient PT services to address RUE deficits s/p right reverse TSA.

## 2022-12-01 NOTE — THERAPY EVALUATION
Acute Care - Physical Therapy Initial Evaluation   Joseph     Patient Name: Harjinder Schneider  : 1960  MRN: [id number]  Today's Date: 2022      Visit Dx:     ICD-10-CM ICD-9-CM   1. Difficulty walking  R26.2 719.7   2. Rotator cuff arthropathy of both shoulders  M12.811 716.81    M12.812      Patient Active Problem List   Diagnosis   • Allergic rhinitis   • Chronic obstructive lung disease (HCC)   • Nonischemic congestive cardiomyopathy (HCC)   • Scoliosis deformity of spine   • Left inguinal hernia   • Arthritis of right shoulder region   • Rotator cuff arthropathy of both shoulders     Past Medical History:   Diagnosis Date   • Arthritis     RA   • Cardiomyopathy (HCC)     KAMILLA CARDIOLOGIST, LOV 2022   • COPD (chronic obstructive pulmonary disease) ([facility])     INHALER   • Hypertension    • Kidney damage     LEFT KIDNEY DAMAGE, NO ISSUES   • Renal anomaly     NONFUNCTIONING LEFT KIDNEY UNKNOWN CAUSE   • Scoliosis    • Shoulder pain     bilateral   • SOB (shortness of breath)     COPD, CARDIOMYOPATHY     Past Surgical History:   Procedure Laterality Date   • HAND SURGERY Right     KNUCKLE REPLACEMENT( OP AND REOP) AND THUMB RECONSTRUCTION   • HERNIA REPAIR Left 2021    INGUINAL   • INGUINAL HERNIA REPAIR Bilateral 2022    Procedure: Robotic Bilateral Inguinal Hernia Repair with Mesh Placement;  Surgeon: Josh Aguilar MD;  Location: [facility];  Service: Robotics - [facility];  Laterality: Bilateral;   • ROTATOR CUFF REPAIR Bilateral      PT Assessment (last 12 hours)     PT Evaluation and Treatment     Row Name 22 1300          Physical Therapy Time and Intention    Document Type evaluation  -AV     Mode of Treatment individual therapy;physical therapy  -AV     Row Name 22 1300          General Information    Patient Profile Reviewed yes  -AV     Prior Level of Function independent:;all household mobility;gait;transfer;ADL's  Ambulated without an assistive device. No home O2.   -AV     Equipment Currently Used at Home none  -AV     Existing Precautions/Restrictions non-weight bearing  NWB RUE  -AV     Row Name 12/01/22 1300          Living Environment    Current Living Arrangements home  -AV     People in Home spouse  -AV     Row Name 12/01/22 1300          Range of Motion (ROM)    Range of Motion bilateral lower extremities;ROM is WFL  -AV     Row Name 12/01/22 1300          Strength (Manual Muscle Testing)    Strength (Manual Muscle Testing) bilateral lower extremities;strength is WFL  -AV     Row Name 12/01/22 1300          Mobility    Extremity Weight-bearing Status right upper extremity  -AV     Right Upper Extremity (Weight-bearing Status) non weight-bearing (NWB)  -AV     Row Name 12/01/22 1300          Bed Mobility    Bed Mobility supine-sit  -AV     Supine-Sit [place] (Bed Mobility) standby assist  -AV     Row Name 12/01/22 1300          Transfers    Transfers sit-stand transfer;stand-sit transfer  -AV     Row Name 12/01/22 1300          Sit-Stand Transfer    Sit-Stand [place] (Transfers) contact guard  -AV     Assistive Device (Sit-Stand Transfers) --  No AD  -AV     Row Name 12/01/22 1300          Stand-Sit Transfer    Stand-Sit [place] (Transfers) contact guard  -AV     Assistive Device (Stand-Sit Transfers) --  No AD  -AV     Row Name 12/01/22 1300          Gait/Stairs (Locomotion)    Gait/Stairs Locomotion gait/ambulation independence;gait/ambulation assistive device;distance ambulated  -AV     [place] Level (Gait) contact guard  -AV     Assistive Device (Gait) --  No AD  -AV     Ambulated day of surgery or within 4 hours of PACU discharge yes  -AV     Distance in Feet (Gait) 25  -AV     Pattern (Gait) step-through  -AV     Row Name 12/01/22 1300          Balance    Balance Assessment standing dynamic balance  -AV     Dynamic Standing Balance contact guard  -AV     Position/Device Used, Standing Balance unsupported  -AV     Row Name             [REMOVED]  Wound 09/19/22 1306 abdomen Incision    Wound - Properties Group Placement Date: 09/19/22  -RB Placement Time: 1306  -RB Present on Hospital Admission: N  -RB Location: abdomen  -RB Primary Wound Type: Incision  -RB Additional Comments: x4  -RB Removal Date: 12/01/22  -DS Removal Time: 0649  -DS Wound Outcome: Healed  -DS    Retired Wound - Properties Group Placement Date: 09/19/22  -RB Placement Time: 1306  -RB Present on Hospital Admission: N  -RB Location: abdomen  -RB Primary Wound Type: Incision  -RB Additional Comments: x4  -RB Removal Date: 12/01/22  -DS Removal Time: 0649  -DS Wound Outcome: Healed  -DS    Retired Wound - Properties Group Date first assessed: 09/19/22  -RB Time first assessed: 1306  -RB Present on Hospital Admission: N  -RB Location: abdomen  -RB Primary Wound Type: Incision  -RB Additional Comments: x4  -RB Resolution Date: 12/01/22  -DS Resolution Time: 0649  -DS Wound Outcome: Healed  -DS    Row Name             Wound 12/01/22 0924 Right shoulder Incision    Wound - Properties Group Placement Date: 12/01/22  -LB Placement Time: 0924  -LB Side: Right  -LB Location: shoulder  -LB Primary Wound Type: Incision  -LB    Retired Wound - Properties Group Placement Date: 12/01/22  -LB Placement Time: 0924  -LB Side: Right  -LB Location: shoulder  -LB Primary Wound Type: Incision  -LB    Retired Wound - Properties Group Date first assessed: 12/01/22  -LB Time first assessed: 0924  -LB Side: Right  -LB Location: shoulder  -LB Primary Wound Type: Incision  -LB    Row Name 12/01/22 1300          Plan of Care Review    Plan of Care Reviewed With patient;spouse  -AV     Progress no change  -AV     Outcome Evaluation Patient not appropriate for inpatient PT services at this time as he has not had a significant decline in functional mobility. Recommend follow-up with outpatient PT services to address RUE deficits s/p right reverse TSA.  -AV     Row Name 12/01/22 1300          Therapy Assessment/Plan (PT)     Patient/Family Therapy Goals Statement (PT) Patient would like to be able to move without pain.  -AV     Criteria for Skilled Interventions Met (PT) no problems identified which require skilled intervention  -AV     Therapy Frequency (PT) evaluation only  -AV     Row Name 12/01/22 1300          PT Evaluation Complexity    History, PT Evaluation Complexity no personal factors and/or comorbidities  -AV     Examination of Body Systems (PT Eval Complexity) total of 4 or more elements  -AV     Clinical Presentation (PT Evaluation Complexity) stable  -AV     Clinical Decision Making (PT Evaluation Complexity) low complexity  -AV     Overall Complexity (PT Evaluation Complexity) low complexity  -AV     Row Name 12/01/22 1300          Therapy Plan Review/Discharge Plan (PT)    Therapy Plan Review (PT) evaluation/treatment results reviewed;patient  -AV           User Key  (r) = Recorded By, (t) = Taken By, (c) = Cosigned By    Initials Name Provider Type    Leslie Briones, RN Registered Nurse    Suraj Gonzalez RN Registered Nurse    Porsha Del Rosario RN Registered Nurse    Tiago Campbell, PT Physical Therapist                Physical Therapy Education     Title: PT OT SLP Therapies (In Progress)     Topic: Physical Therapy (In Progress)     Point: Mobility training (Done)     Learning Progress Summary           Patient Acceptance, E,TB, VU by AV at 12/1/2022 1405                   Point: Home exercise program (Not Started)     Learner Progress:  Not documented in this visit.          Point: Body mechanics (Done)     Learning Progress Summary           Patient Acceptance, E,TB, VU by AV at 12/1/2022 1405                   Point: Precautions (Done)     Learning Progress Summary           Patient Acceptance, E,TB, VU by AV at 12/1/2022 1405                               User Key     Initials Effective Dates Name Provider Type Discipline    AV 06/11/21 -  Tiago Villalobos, PT Physical Therapist PT               PT Recommendation and Plan  Anticipated Discharge Disposition (PT): home with outpatient therapy services  Therapy Frequency (PT): evaluation only  Plan of Care Reviewed With: patient, spouse  Progress: no change  Outcome Evaluation: Patient not appropriate for inpatient PT services at this time as he has not had a significant decline in functional mobility. Recommend follow-up with outpatient PT services to address RUE deficits s/p right reverse TSA.   Outcome Measures     Row Name 12/01/22 1400             How much help from another person do you currently need...    Turning from your back to your side while in flat bed without using bedrails? 4  -AV      Moving from lying on back to sitting on the side of a flat bed without bedrails? 4  -AV      Moving to and from a bed to a chair (including a wheelchair)? 3  -AV      Standing up from a chair using your arms (e.g., wheelchair, bedside chair)? 3  -AV      Climbing 3-5 steps with a railing? 3  -AV      To walk in hospital room? 3  -AV      AM-PAC 6 Clicks Score (PT) 20  -AV         Functional Assessment    Outcome Measure Options AM-PAC 6 Clicks Basic Mobility (PT)  -AV            User Key  (r) = Recorded By, (t) = Taken By, (c) = Cosigned By    Initials Name Provider Type    Tiago Campbell, ELEANOR Physical Therapist                 Time Calculation:    PT Charges     Row Name 12/01/22 1404             Time Calculation    PT Received On 12/01/22  -AV         Untimed Charges    PT Eval/Re-eval Minutes 20  -AV         Total Minutes    Untimed Charges Total Minutes 20  -AV       Total Minutes 20  -AV            User Key  (r) = Recorded By, (t) = Taken By, (c) = Cosigned By    Initials Name Provider Type    Tiago Campbell, ELEANOR Physical Therapist              Therapy Charges for Today     Code Description Service Date Service Provider Modifiers Qty    13895923920 HC PT EVAL LOW COMPLEXITY 2 12/1/2022 Tiago Villalobos, PT GP 1          PT G-Codes  Outcome  Measure Options: AM-PAC 6 Clicks Basic Mobility (PT)  -[facility] 6 Clicks Score (PT): 20    Tiago Villalobos, PT  12/1/2022

## 2022-12-01 NOTE — PLAN OF CARE
Goal Outcome Evaluation:      Pt stable throughout shift, VSS. Dressing clean, dry and intact. Ambulated to bathroom with nursing staff. No complaint of pain during shift. Anticipates discharge home with outpatient therapy tomorrow.

## 2022-12-01 NOTE — H&P
[facility]   HISTORY AND PHYSICAL    Patient Name: Harjinder Schneider  : 1960  MRN: [id number]  Primary Care Physician:  Giuliana Crowe APRN  Date of admission: 2022    Subjective   Subjective     Chief Complaint: Right shoulder pain    History of Present Illness    Harjinder presents today for his right shoulder. Patient has bilateral shoulder pain with his right being worse than his left. The patient has been treating his rotator cuff arthropathy conservatively. He has pain at night.  He has pain at rest.  He has pain with activity.  He has pain with reaching and lifting.  He states his shoulders feel weak bilaterally.  He denies any radiating pain or numbness.  The pain localizes to the lateral shoulders.  He is right-hand dominant. He is a rheumatoid patient.  He has had bilateral shoulder surgery in the past, approximately 8 years ago.  He had attempted rotator cuff repairs done bilaterally.     Review of Systems     Constitutional: Denies fevers, chills, weight loss  Cardiovascular: Denies chest pain, shortness of breath  Skin: Denies rashes, acute skin changes  Neurologic: Denies headache, loss of consciousness  MSK: Bilateral shoulder pain       Personal History     Past Medical History:   Diagnosis Date   • Arthritis     RA   • Cardiomyopathy (HCC)     KAMILLA CARDIOLOGIST, LOV 2022   • COPD (chronic obstructive pulmonary disease) ([facility])     INHALER   • Hypertension    • Kidney damage     LEFT KIDNEY DAMAGE, NO ISSUES   • Renal anomaly     NONFUNCTIONING LEFT KIDNEY UNKNOWN CAUSE   • Scoliosis    • Shoulder pain     bilateral   • SOB (shortness of breath)     COPD, CARDIOMYOPATHY       Past Surgical History:   Procedure Laterality Date   • HAND SURGERY Right     KNUCKLE REPLACEMENT( OP AND REOP) AND THUMB RECONSTRUCTION   • HERNIA REPAIR Left 2021    INGUINAL   • INGUINAL HERNIA REPAIR Bilateral 2022    Procedure: Robotic Bilateral Inguinal Hernia Repair with Mesh Placement;  Surgeon:  Josh Aguilar MD;  Location: [facility] MAIN OR;  Service: Robotics - [facility];  Laterality: Bilateral;   • ROTATOR CUFF REPAIR Bilateral        Family History: family history includes Arthritis in his mother; Crohn's disease in his father; Heart disease in his father; Hypertension in his mother; Stroke in his mother; Thyroid disease in his mother. Otherwise pertinent FHx was reviewed and not pertinent to current issue.    Social History:  reports that he quit smoking about 14 years ago. His smoking use included cigarettes. He smoked an average of 1 pack per day. He has never used smokeless tobacco. He reports current alcohol use. He reports that he does not use drugs.    Home Medications:  albuterol sulfate HFA, aspirin, carvedilol, chlorpheniramine, cyclobenzaprine, diclofenac, and lisinopril    Allergies:  Allergies   Allergen Reactions   • Penicillins Unknown - Low Severity     CHILDHOOD ALLERGY UNKNOWN REACTION BY PT       Objective    Objective     Vitals:   Temp:  [96.6 °F (35.9 °C)] 96.6 °F (35.9 °C)  Heart Rate:  [64] 64  Resp:  [18] 18  BP: (152)/(84) 152/84    Physical Exam    General: Alert. No acute distress.   Cardiac: Intact peripheral pulses  Pulmonary: Unlabored respirations on room air.  Right upper extremity: No areas of tenderness.  Active elevation 180 degrees, external rotation to 45 degrees, internal rotation to the lower lumbar spine.  4- out of 5 with supraspinatus and infraspinatus testing.  5 out of 5 subscapularis testing.  No mechanical symptoms with motion.  Pain with speeds testing.  No pain with crossarm adduction.  Distal neurovascular intact.    Result Review    Result Review:  I have personally reviewed the results from the time of this admission to 12/1/2022 06:18 EST and agree with these findings:  [x]  Laboratory list / accordion  []  Microbiology  [x]  Radiology  []  EKG/Telemetry   []  Cardiology/Vascular   []  Pathology  []  Old records  []  Other:      Assessment & Plan    Assessment / Plan     Brief Patient Summary:  Harjinder Schneider is a 62 y.o. male with right shoulder rotator cuff arthropathy. Patient presents today for surgical management with right total reverse shoulder arthroplasty.     Active Hospital Problems:  Active Hospital Problems    Diagnosis    • **Rotator cuff arthropathy of both shoulders      Plan:     Harjinder has bilateral rotator cuff arthropathy with his right being worse than his left. We discussed the treatment options with the patient, operative vs non-operative. The patient has failed conservative treatment. Discussed the risks and benefits of a Right Reverse Total Shoulder Arthroplasty with the patient. Discussed surgery. Risks/benefits discussed with patient including, but not limited to: Bleeding, infection, damage nerves and blood vessels, hardware complications, fracture, loosening, instability, persistent pain, cosmetic deformity, anesthesia risk including mortality, DVT/PE, and need for additional procedures.  Discussed with patient the implant type being used during surgery. A preoperative CT scan was completed. The patient expressed understanding and wished to proceed with operative management.        DVT prophylaxis:  No DVT prophylaxis order currently exists.      Glendy Ha PA-C      I have seen and examined the above patient and agree with the plan.     Cardiac: Intact peripheral pulses  Pulmonary: Nonlabored respirations on room air.   Right upper extremity: Skin intact.  Distal neurovascular intact.      We reviewed the risk, benefits, indications, and alternatives to a right reverse total shoulder arthroplasty.  The patient elected to proceed with surgery and consent was obtained.      Electronically signed by Vadim Rooney MD, 12/01/22, 6:42 AM EST.

## 2022-12-01 NOTE — OP NOTE
TOTAL SHOULDER REVERSE ARTHROPLASTY  Procedure Report    Patient Name:  Harjinder Schneider  YOB: 1960    Date of Surgery:  12/1/2022       Inidcations: Harjinder is a 62-year-old male with a history of failed rotator cuff repair and rotator cuff arthropathy in his right shoulder.  We discussed treatment options.  We discussed both operative and nonoperative management.  We discussed the risk, benefits, indications, and alternatives to right reverse total shoulder arthroplasty.  He elected to proceed with surgery and consent was obtained.    Pre-op Diagnosis:   Rotator cuff arthropathy of both shoulders [M12.811, M12.812]       Post-Op Diagnosis Codes:     * Rotator cuff arthropathy of both shoulders [M12.811, M12.812]    Procedure/CPT® Codes:      Procedure(s):  TOTAL SHOULDER REVERSE ARTHROPLASTY    Staff:  Surgeon(s):  Vadim Rooney MD    Assistant: Glendy Ha PA-C; Giuliana Jackson RN    Anesthesia: General    Estimated Blood Loss: 100 mL    Implants:    Implant Name Type Inv. Item Serial No.  Lot No. LRB No. Used Action   SUT FW #2 W/TPR NDL 1/2 CIR 38IN 97CM 26.5MM KENNETH - [id number] Implant SUT FW #2 W/TPR NDL 1/2 CIR 38IN 97CM 26.5MM KENNETH  [id number] Right 1 Implanted   GLENOSPHERE VERSA DIAL FIX STD 36MM - [id number] Implant GLENOSPHERE VERSA DIAL FIX STD 36MM  [employer] [id number] Right 1 Implanted   BASEPLT MARTINE COMPR MINI W TPR ADAPTR 25 - [id number] Implant BASEPLT MARTINE COMPR MINI W TPR ADAPTR 25  [employer] [id number] Right 1 Implanted   SCRW COMPRNSV CNTRL 6.5X30MM REUS - [id number] Implant SCRW COMPRNSV CNTRL 6.5X30MM REUS  [employer] [id number] Right 1 Implanted   SCRW FIX LK HEX 4.91N78RQ - [id number] Implant SCRW FIX LK HEX 4.27Z70HN  [employer] [id number] Right 1 Implanted   SCRW FIX LK HEX 4.98Q75WW - [id number] Implant SCRW FIX LK HEX 4.90M30ZN  [employer] [id number] Right 1 Implanted   SCRW FIX LK HEX 4.33R73JX - [id number] Implant SCRW FIX LK HEX 4.21C56FL  [employer]  [id number] Right 1 Implanted   SCRW FIX LK HEX 4.45G04AQ - [id number] Implant SCRW FIX LK HEX 4.55B64RV  [employer] [id number] Right 1 Implanted   STEM HUM/SHLDR COMPREHENSIVE MINI 87O40WW - [id number] Implant STEM HUM/SHLDR COMPREHENSIVE MINI [id number]  [employer] [id number] Right 1 Implanted   TRY HUM/SHLDR COMPREHENSIVE/REVERSE MINI COCR STD PLS3MM 40M - [id number] Implant TRY HUM/SHLDR COMPREHENSIVE/REVERSE MINI COCR STD PLS3MM 40M  [employer] [id number] Right 1 Implanted   BEAR HUM PROLNG PLS3 36MM - [id number] Implant BEAR HUM PROLNG PLS3 36MM  [employer] [id number] Right 1 Implanted       Specimen:          None      Complications: None    Description of Procedure:   The patient was taken to the operating room and placed supine on the table after interscalene block was done in preoperative holding.  After general endotracheal anesthesia was established, the patient was placed in the beach chair position. The right shoulder and upper extremity were prepped and draped in the standard usual fashion using alcohol and ChloraPrep.  A standard 10 cm deltopectoral incision was made.  Dissection was carried down to the mid deltopectoral groove.  The cephalic vein was mobilized medially and protected throughout the case.  The deep retractors were placed after incising lateral border of the coracobrachialis tendon.  The superior third of the pectoralis major tendon was released.  An anterior capsulotomy was then done and the proximal humerus was dislocated and delivered through the wound.  There was evidence of significant rotator cuff tearing and glenohumeral arthrosis.  The canal was sequentially hand-reamed to accommodate a size 10 reamer, then the intramedullary cutting block was placed over the  and the proximal humerus cut was made in appropriate version.  Then the canal was sequentially broached to accommodate a size 11 Biomet mini stem trial and then the glenoid was exposed.  There was significant wear of  the glenoid.  The labrum was removed.  The central guide pin was placed in the appropriate position. Then the glenoid was reamed and then the baseplate was inserted and impacted over the guide pin.  Excellent fixation was achieved with just initial impaction and then the central cortical screw was placed with appropriate depth and the locking screws were filled in all four quadrants.  Then the glenosphere was implanted in the appropriate orientation and then trials were undertaken.  The trial broach was removed.  The canal was copiously irrigated and dried and the size 11 Biomet mini stem was implanted in the same version as the trials.  Then trials were again undertaken and a +3 offset standard tray and +3 poly was chosen to be the best fit.  It was implanted.  The shoulder was relocated, taken through a range of motion; it was stable throughout all planes, including extension and external rotation, and was not overstuffed.  The wound was then copiously irrigated with Irrisept bacitracin Simpulse irrigation.  The deltopectoral groove was closed with 0 Vicryl.  The deep fat was closed with 0 Vicryl.  The subcut was closed with 2-0 Vicryl.  The skin was closed with staples.  The incisions were washed and dried, and sterile dressings were applied.  The patient was placed on an abduction pillow in a sling, tolerated the procedure well, was extubated and taken to the recovery room.     Assistant: Glendy Ha PA-C; Giuliana Jackson RN  was responsible for performing the following activities: Retraction, Suction, Irrigation, Closing and Placing Dressing and their skilled assistance was necessary for the success of this case.    Vadim Rooney MD     Date: 12/1/2022  Time: 10:44 EST

## 2022-12-01 NOTE — ANESTHESIA PREPROCEDURE EVALUATION
Anesthesia Evaluation     Patient summary reviewed and Nursing notes reviewed   no history of anesthetic complications:  NPO Solid Status: > 8 hours  NPO Liquid Status: > 2 hours           Airway   Mallampati: I  TM distance: >3 FB  Neck ROM: full  No difficulty expected  Dental    (+) upper dentures    Pulmonary - normal exam    breath sounds clear to auscultation  (+) COPD, shortness of breath,   Cardiovascular - normal exam  Exercise tolerance: good (4-7 METS)    Rhythm: regular  Rate: normal    (+) hypertension,       Neuro/Psych- negative ROS  GI/Hepatic/Renal/Endo    (+)   renal disease,     Musculoskeletal     Abdominal    Substance History - negative use     OB/GYN negative ob/gyn ROS         Other   arthritis,      ROS/Med Hx Other: HX OF COPD, HTN, LT KIDNEY NON FUNCTIONING, CARDIOMYOPATHY. LAST O.V. DR. HARRIS 8/16/22. ECHO 8/22/22 EF 36-40%, CARDIAC CLEARED INTERMEDIATE RISK. NO CP, +SOA (NOT NEW). METS>4. [facility]/ DR. REYES REVIEWED. [facility]                   Anesthesia Plan    ASA 2     general     (Patient understands anesthesia not responsible for dental damage.)  intravenous induction     Anesthetic plan, risks, benefits, and alternatives have been provided, discussed and informed consent has been obtained with: patient.    Use of blood products discussed with patient .   Plan discussed with CRNA.        CODE STATUS:

## 2022-12-01 NOTE — ANESTHESIA PROCEDURE NOTES
Peripheral Block      Patient reassessed immediately prior to procedure    Patient location during procedure: pre-op  Start time: 12/1/2022 7:49 AM  Stop time: 12/1/2022 7:53 AM  Reason for block: at surgeon's request and post-op pain management  Performed by  Anesthesiologist: David Sanches MD  Preanesthetic Checklist  Completed: patient identified, IV checked, site marked, risks and benefits discussed, surgical consent, monitors and equipment checked, pre-op evaluation and timeout performed  Prep:  Pt Position: supine (HOB elevated)  Sterile barriers:cap, washed/disinfected hands, sterile barriers, gloves, mask, partial drape and alcohol skin prep  Prep: ChloraPrep  Patient monitoring: blood pressure monitoring, continuous pulse oximetry and EKG  Procedure    Sedation: yes    Guidance:ultrasound guided and nerve stimulator    ULTRASOUND INTERPRETATION.  Using ultrasound guidance a 22 G gauge needle was placed in close proximity to the brachial plexus nerve, at which point, under ultrasound guidance anesthetic was injected in the area of the nerve and spread of the anesthesia was seen on ultrasound in close proximity thereto.  There were no abnormalities seen on ultrasound; a digital image was taken; and the patient tolerated the procedure with no complications. Images:still images obtained, printed/placed on chart    Laterality:right  Block Type:interscalene  Injection Technique:single-shot  Needle Type:echogenic  Needle Gauge:22 G (2in)  Resistance on Injection: none    Medications Used: ropivacaine (NAROPIN) 0.5 % injection - Injection   30 mL - 12/1/2022 7:49:00 AM      Post Assessment  Injection Assessment: negative aspiration for heme, no paresthesia on injection and incremental injection  Patient Tolerance:comfortable throughout block  Complications:no  Additional Notes  The block or continuous infusion is requested by the referring physician for management of postoperative pain, or pain related to a  procedure. Ultrasound guidance (deemed medically necessary). Painless injection, pt was awake and conversant during the procedure without complications. Needle and surrounding structures visualized throughout procedure. No adverse reactions or complications seen during this period. Post-procedure image showed no signs of complication, and anatomy was consistent with an uncomplicated nerve blockade.

## 2022-12-02 VITALS
SYSTOLIC BLOOD PRESSURE: 142 MMHG | BODY MASS INDEX: 28.1 KG/M2 | RESPIRATION RATE: 18 BRPM | WEIGHT: 168.65 LBS | HEART RATE: 87 BPM | DIASTOLIC BLOOD PRESSURE: 90 MMHG | TEMPERATURE: 97.4 F | HEIGHT: 65 IN | OXYGEN SATURATION: 97 %

## 2022-12-02 PROBLEM — M19.011 ARTHRITIS OF RIGHT SHOULDER REGION: Status: RESOLVED | Noted: 2022-10-26 | Resolved: 2022-12-02

## 2022-12-02 PROBLEM — I50.22 CHRONIC HFREF (HEART FAILURE WITH REDUCED EJECTION FRACTION): Status: ACTIVE | Noted: 2022-12-02

## 2022-12-02 LAB
ANION GAP SERPL CALCULATED.3IONS-SCNC: 8.8 MMOL/L (ref 5–15)
BUN SERPL-MCNC: 12 MG/DL (ref 8–23)
BUN/CREAT SERPL: 18.5 (ref 7–25)
CALCIUM SPEC-SCNC: 8.9 MG/DL (ref 8.6–10.5)
CHLORIDE SERPL-SCNC: 104 MMOL/L (ref 98–107)
CO2 SERPL-SCNC: 24.2 MMOL/L (ref 22–29)
CREAT SERPL-MCNC: 0.65 MG/DL (ref 0.76–1.27)
EGFRCR SERPLBLD CKD-EPI 2021: 106.5 ML/MIN/1.73
GLUCOSE SERPL-MCNC: 117 MG/DL (ref 65–99)
HCT VFR BLD AUTO: 38.4 % (ref 37.5–51)
HGB BLD-MCNC: 13.3 G/DL (ref 13–17.7)
POTASSIUM SERPL-SCNC: 4.1 MMOL/L (ref 3.5–5.2)
SODIUM SERPL-SCNC: 137 MMOL/L (ref 136–145)

## 2022-12-02 PROCEDURE — 85018 HEMOGLOBIN: CPT | Performed by: STUDENT IN AN ORGANIZED HEALTH CARE EDUCATION/TRAINING PROGRAM

## 2022-12-02 PROCEDURE — A9270 NON-COVERED ITEM OR SERVICE: HCPCS | Performed by: INTERNAL MEDICINE

## 2022-12-02 PROCEDURE — 63710000001 FERROUS SULFATE 325 (65 FE) MG TABLET: Performed by: STUDENT IN AN ORGANIZED HEALTH CARE EDUCATION/TRAINING PROGRAM

## 2022-12-02 PROCEDURE — A9270 NON-COVERED ITEM OR SERVICE: HCPCS | Performed by: STUDENT IN AN ORGANIZED HEALTH CARE EDUCATION/TRAINING PROGRAM

## 2022-12-02 PROCEDURE — 25010000002 CEFAZOLIN IN DEXTROSE 2-4 GM/100ML-% SOLUTION: Performed by: STUDENT IN AN ORGANIZED HEALTH CARE EDUCATION/TRAINING PROGRAM

## 2022-12-02 PROCEDURE — 63710000001 LISINOPRIL 20 MG TABLET: Performed by: INTERNAL MEDICINE

## 2022-12-02 PROCEDURE — 63710000001 ASPIRIN EC 325 MG TABLET DELAYED-RELEASE: Performed by: STUDENT IN AN ORGANIZED HEALTH CARE EDUCATION/TRAINING PROGRAM

## 2022-12-02 PROCEDURE — 97535 SELF CARE MNGMENT TRAINING: CPT

## 2022-12-02 PROCEDURE — 97110 THERAPEUTIC EXERCISES: CPT

## 2022-12-02 PROCEDURE — 97165 OT EVAL LOW COMPLEX 30 MIN: CPT

## 2022-12-02 PROCEDURE — 99024 POSTOP FOLLOW-UP VISIT: CPT | Performed by: STUDENT IN AN ORGANIZED HEALTH CARE EDUCATION/TRAINING PROGRAM

## 2022-12-02 PROCEDURE — 63710000001 FAMOTIDINE 20 MG TABLET: Performed by: STUDENT IN AN ORGANIZED HEALTH CARE EDUCATION/TRAINING PROGRAM

## 2022-12-02 PROCEDURE — 63710000001 OXYCODONE-ACETAMINOPHEN 5-325 MG TABLET: Performed by: STUDENT IN AN ORGANIZED HEALTH CARE EDUCATION/TRAINING PROGRAM

## 2022-12-02 PROCEDURE — 63710000001 CARVEDILOL 12.5 MG TABLET: Performed by: INTERNAL MEDICINE

## 2022-12-02 PROCEDURE — 99213 OFFICE O/P EST LOW 20 MIN: CPT | Performed by: INTERNAL MEDICINE

## 2022-12-02 PROCEDURE — 85014 HEMATOCRIT: CPT | Performed by: STUDENT IN AN ORGANIZED HEALTH CARE EDUCATION/TRAINING PROGRAM

## 2022-12-02 PROCEDURE — 80048 BASIC METABOLIC PNL TOTAL CA: CPT | Performed by: STUDENT IN AN ORGANIZED HEALTH CARE EDUCATION/TRAINING PROGRAM

## 2022-12-02 PROCEDURE — 94799 UNLISTED PULMONARY SVC/PX: CPT

## 2022-12-02 RX ORDER — OXYCODONE HYDROCHLORIDE AND ACETAMINOPHEN 5; 325 MG/1; MG/1
1 TABLET ORAL EVERY 4 HOURS PRN
Qty: 30 TABLET | Refills: 0 | Status: SHIPPED | OUTPATIENT
Start: 2022-12-02 | End: 2022-12-08

## 2022-12-02 RX ORDER — ASPIRIN 325 MG
325 TABLET, DELAYED RELEASE (ENTERIC COATED) ORAL DAILY
Qty: 30 TABLET | Refills: 0 | Status: SHIPPED | OUTPATIENT
Start: 2022-12-02

## 2022-12-02 RX ORDER — AMOXICILLIN 250 MG
2 CAPSULE ORAL 2 TIMES DAILY
Qty: 20 TABLET | Refills: 0 | Status: SHIPPED | OUTPATIENT
Start: 2022-12-02 | End: 2022-12-14

## 2022-12-02 RX ADMIN — OXYCODONE AND ACETAMINOPHEN 1 TABLET: 5; 325 TABLET ORAL at 02:17

## 2022-12-02 RX ADMIN — CARVEDILOL 12.5 MG: 12.5 TABLET, FILM COATED ORAL at 10:18

## 2022-12-02 RX ADMIN — CEFAZOLIN SODIUM 2 G: 2 INJECTION, SOLUTION INTRAVENOUS at 00:34

## 2022-12-02 RX ADMIN — OXYCODONE AND ACETAMINOPHEN 2 TABLET: 5; 325 TABLET ORAL at 06:07

## 2022-12-02 RX ADMIN — FERROUS SULFATE TAB 325 MG (65 MG ELEMENTAL FE) 325 MG: 325 (65 FE) TAB at 10:18

## 2022-12-02 RX ADMIN — FAMOTIDINE 40 MG: 20 TABLET, FILM COATED ORAL at 10:18

## 2022-12-02 RX ADMIN — OXYCODONE AND ACETAMINOPHEN 2 TABLET: 5; 325 TABLET ORAL at 10:21

## 2022-12-02 RX ADMIN — LISINOPRIL 40 MG: 20 TABLET ORAL at 10:18

## 2022-12-02 RX ADMIN — ASPIRIN 325 MG: 325 TABLET, COATED ORAL at 10:18

## 2022-12-02 NOTE — PLAN OF CARE
Goal Outcome Evaluation:  Plan of Care Reviewed With: patient        Progress: improving  Outcome Evaluation: Patient has orders to discharge home today with outpatient physical therapy scheduled.  Patient reviewed Andrew dressing technique and home safety guidelines to maximize safety and independence when returning home

## 2022-12-02 NOTE — DISCHARGE INSTRUCTIONS
Orthopedic instructions: No lifting with the operative arm.  Wear your sling at all times.  You may perform pendulums, elbow range of motion exercises, and  strength exercises as instructed by the therapy team.  Follow dressing change instructions provided by the nursing staff.  Monitor for increasing redness, drainage, fevers, chills.  Take aspirin 325 mg daily.  2-week orthopedic follow-up for reevaluation.  Please call with any concerns.

## 2022-12-02 NOTE — DISCHARGE SUMMARY
[facility]         HOSPITALIST  DISCHARGE SUMMARY    Patient Name: Harjinder Schneider  : 1960  MRN: [id number]    Date of Admission: 2022  Date of Discharge:  22  Primary Care Physician: Giuliana Crowe APRN    Consults     Date and Time Order Name Status Description    2022 11:29 AM Inpatient Hospitalist Consult            Active and Resolved Hospital Problems:  Active Hospital Problems    Diagnosis POA   • **Status post reverse arthroplasty of right shoulder [Z96.611] Not Applicable   • Chronic HFrEF (heart failure with reduced ejection fraction) ([facility]) [I50.22] Unknown   • Hypertension [I10] Yes   • Rotator cuff arthropathy of both shoulders [M12.811, M12.812] Unknown   • Chronic obstructive lung disease ([facility]) [J44.9] Yes      Resolved Hospital Problems    Diagnosis POA   • Arthritis of right shoulder region [M19.011] Yes       Hospital Course     Hospital Course:  Harjinder Schneider is a 62 y.o. male with hypertension, COPD not on home oxygen and managed with albuterol inhaler as needed, ischemic cardiomyopathy and right shoulder rotator cuff arthropathy who underwent elective right total shoulder replacement on 2022.   Uncomplicated postoperative course.  Pain controlled.  Tolerating oral intake.  Progressed well with PT and set up with outpatient physical therapy.  Cleared from orthopedic service who provided narcotic prescription.  Discharged on full dose aspirin for DVT prophylaxis.  Activity restrictions provided.  Set up with orthopedic surgeon follow-up.  Discharged home in stable condition      DISCHARGE Follow Up Recommendations for labs and diagnostics: NONE      Day of Discharge     Vital Signs:  Temp:  [97 °F (36.1 °C)-97.9 °F (36.6 °C)] 97.9 °F (36.6 °C)  Heart Rate:  [] 79  Resp:  [16-18] 18  BP: (111-150)/(66-90) 150/83  Flow (L/min):  [1-3] 1  Physical Exam:   GENERAL: The patient is conversant and nontoxic.  HEENT: PERRLA, Oropharynx clear. Moist mucous  membranes.   NECK: Supple, trachea midline  HEART: Regular rate and rhythm. No edema  LUNGS: Equal air entry, clear to auscultation bilaterally.  ABDOMEN: Soft, positive bowel sounds, nontender, nondistended  MSK: RUE in sling.   SKIN: No rash or open wounds   NEUROLOGIC: Alert, cranial nerves grossly intact, speech clear    Discharge Details        Discharge Medications      New Medications      Instructions Start Date   aspirin  MG tablet   325 mg, Oral, Daily      oxyCODONE-acetaminophen 5-325 MG per tablet  Commonly known as: PERCOCET   1 tablet, Oral, Every 4 Hours PRN      sennosides-docusate 8.6-50 MG per tablet  Commonly known as: PERICOLACE   2 tablets, Oral, 2 Times Daily         Continue These Medications      Instructions Start Date   albuterol sulfate  (90 Base) MCG/ACT inhaler  Commonly known as: PROVENTIL HFA;VENTOLIN HFA;PROAIR HFA   2 puffs, Inhalation, Every 4 Hours PRN      carvedilol 12.5 MG tablet  Commonly known as: COREG   12.5 mg, Oral, 2 Times Daily      chlorpheniramine 4 MG tablet  Commonly known as: CHLOR-TRIMETON   8 mg, Oral, Daily      cyclobenzaprine 10 MG tablet  Commonly known as: FLEXERIL   10 mg, Oral, 3 Times Daily PRN      diclofenac 75 MG EC tablet  Commonly known as: VOLTAREN   75 mg, Oral, 2 Times Daily      lisinopril 40 MG tablet  Commonly known as: PRINIVIL,ZESTRIL   40 mg, Oral, Daily             Allergies   Allergen Reactions   • Penicillins Unknown - Low Severity     CHILDHOOD ALLERGY UNKNOWN REACTION BY PT       Discharge Disposition:  Home or Self Care    Diet:  Hospital:  Diet Order   Procedures   • Diet: Regular/House Diet; Texture: Regular Texture (IDDSI 7); Fluid Consistency: Thin (IDDSI 0)       Discharge Activity:   Nonweightbearing right upper extremity in sling  Okay for pendulums, elbow range of motion exercises,  strengthening  No elevation greater than 90, external rotation greater than 10, internal rotation of the shoulder      CODE  STATUS:  Code Status and Medical Interventions:   Ordered at: 12/01/22 1547     Code Status (Patient has no pulse and is not breathing):    CPR (Attempt to Resuscitate)     Medical Interventions (Patient has pulse or is breathing):    Full Support     Release to patient:    Routine Release         Future Appointments   Date Time Provider Department Center   12/14/2022  9:00 AM Glendy Ha PA-C [facility] [place] [facility]   2/9/2023 10:30 AM David Guerrero [place] [facility] [place] [facility]   4/3/2023  9:30 AM [facility] [facility] APRIL [facility]       Additional Instructions for the Follow-ups that You Need to Schedule     Discharge Follow-up with Specialty: Orthopedic surgery   As directed      Specialty: Orthopedic surgery    Follow Up Details: 12/14/2022               Pertinent  and/or Most Recent Results     PROCEDURES:   TOTAL SHOULDER REVERSE ARTHROPLASTY    LAB RESULTS:      Lab 12/02/22 0418   HEMOGLOBIN 13.3   HEMATOCRIT 38.4         Lab 12/02/22  0418   SODIUM 137   POTASSIUM 4.1   CHLORIDE 104   CO2 24.2   ANION GAP 8.8   BUN 12   CREATININE 0.65*   EGFR 106.5   GLUCOSE 117*   CALCIUM 8.9                         Brief Urine Lab Results  (Last result in the past 365 days)      Color   Clarity   Blood   Leuk Est   Nitrite   Protein   CREAT   Urine HCG        07/06/22 1138 Yellow   Clear   Negative   Negative   Negative   Negative               Microbiology Results (last 10 days)     ** No results found for the last 240 hours. **          XR Shoulder 1 View Right    Result Date: 12/1/2022  Impression:   Two views the right shoulder demonstrating reverse type prosthesis in place.      KINA PACHECO MD       Electronically Signed and Approved By: KINA PACHECO MD on 12/01/2022 at 12:12                       Results for orders placed during the hospital encounter of 08/22/22    Adult Transthoracic Echo Complete W/ Cont if Necessary Per Protocol    Interpretation Summary  · The left ventricular cavity is borderline  dilated.  · Left ventricular ejection fraction appears to be 36 - 40%.  · Left ventricular diastolic function is consistent with (grade I) impaired relaxation.  · No significant valvular disease.      Labs Pending at Discharge: NONE    Time spent on Discharge including face to face service:  >30 minutes    Electronically signed by Ric Purdy DO, 12/02/22, 9:58 AM EST.

## 2022-12-02 NOTE — PLAN OF CARE
Goal Outcome Evaluation:  Plan of Care Reviewed With: patient        Progress: no change  Outcome Evaluation: pt. is a standby assist.  pt. medicated x one with relief noted.  voiding without difficulty.  upon discharge pt is going home with outpt PT.  no significant changes noted this shift.

## 2022-12-02 NOTE — THERAPY EVALUATION
Patient Name: Harjinder Schneider  : 1960    MRN: [id number]                              Today's Date: 2022       Admit Date: 2022    Visit Dx:     ICD-10-CM ICD-9-CM   1. Difficulty walking  R26.2 719.7   2. Rotator cuff arthropathy of both shoulders  M12.811 716.81    M12.812    3. Status post reverse arthroplasty of right shoulder  Z96.611 V43.61   4. Decreased activities of daily living (ADL)  Z78.9 V49.89     Patient Active Problem List   Diagnosis   • Allergic rhinitis   • Chronic obstructive lung disease (HCC)   • Nonischemic congestive cardiomyopathy (HCC)   • Scoliosis deformity of spine   • Left inguinal hernia   • Arthritis of right shoulder region   • Rotator cuff arthropathy of both shoulders   • Hypertension   • Status post reverse arthroplasty of right shoulder   • Chronic HFrEF (heart failure with reduced ejection fraction) ([facility])     Past Medical History:   Diagnosis Date   • Arthritis     RA   • Cardiomyopathy ([facility])     KAMILLA CARDIOLOGIST, LOV 2022   • COPD (chronic obstructive pulmonary disease) ([facility])     INHALER   • Hypertension    • Kidney damage     LEFT KIDNEY DAMAGE, NO ISSUES   • Renal anomaly     NONFUNCTIONING LEFT KIDNEY UNKNOWN CAUSE   • Scoliosis    • Shoulder pain     bilateral   • SOB (shortness of breath)     COPD, CARDIOMYOPATHY     Past Surgical History:   Procedure Laterality Date   • HAND SURGERY Right     KNUCKLE REPLACEMENT( OP AND REOP) AND THUMB RECONSTRUCTION   • HERNIA REPAIR Left 2021    INGUINAL   • INGUINAL HERNIA REPAIR Bilateral 2022    Procedure: Robotic Bilateral Inguinal Hernia Repair with Mesh Placement;  Surgeon: Josh Aguilar MD;  [facility]: [facility];  Service: [facility];  Laterality: Bilateral;   • ROTATOR CUFF REPAIR Bilateral    • TOTAL SHOULDER ARTHROPLASTY W/ DISTAL CLAVICLE EXCISION Right 2022    Procedure: TOTAL SHOULDER REVERSE ARTHROPLASTY;  Surgeon: Vadim Rooney MD;  Location: [facility] MAIN OR;  Service:  Orthopedics;  Laterality: Right;      General Information     Row Name 12/02/22 0922 12/02/22 0916       OT Time and Intention    Document Type therapy note (daily note)  -PG evaluation  -PG    Mode of Treatment individual therapy;occupational therapy  -PG individual therapy;occupational therapy  -PG    Row Name 12/02/22 0916          General Information    Patient Profile Reviewed yes  -PG     Prior Level of Function independent:;ADL's;transfer  -PG     Existing Precautions/Restrictions non-weight bearing  -PG     Barriers to Rehab none identified  -PG     Row Name 12/02/22 0916          Occupational Profile    Reason for Services/Referral (Occupational Profile) Patient is a pleasant 62-year-old male admitted for a right total shoulder replacement.  No previous OT services identified.  Patient is being evaluated by Occupational Therapy secondary to recent ADL deficits  -PG     Row Name 12/02/22 0916          Living Environment    People in Home spouse  -PG     Row Name 12/02/22 0916          Cognition    Orientation Status (Cognition) oriented x 3  -PG     Row Name 12/02/22 0916          Safety Issues, Functional Mobility    Impairments Affecting Function (Mobility) strength;range of motion (ROM)  -PG           User Key  (r) = Recorded By, (t) = Taken By, (c) = Cosigned By    Initials Name Provider Type    PG Cabrera Dawson, OT Occupational Therapist                 Mobility/ADL's     Row Name 12/02/22 0917          Sit-Stand Transfer    Sit-Stand [place] (Transfers) independent  -PG     Row Name 12/02/22 0917          Stand-Sit Transfer    Stand-Sit [place] (Transfers) independent  -PG     Row Name 12/02/22 0917          Activities of Daily Living    BADL Assessment/Intervention bathing;upper body dressing;lower body dressing;grooming;toileting  -PG     Row Name 12/02/22 0922 12/02/22 0917       Bathing Assessment/Intervention    [place] Level (Bathing) bathing skills;minimum assist (75% patient  effort)  -PG bathing skills;minimum assist (75% patient effort)  -PG    Row Name 12/02/22 0917          Upper Body Dressing Assessment/Training    [place] Level (Upper Body Dressing) upper body dressing skills;minimum assist (75% patient effort)  -PG     Row Name 12/02/22 0922 12/02/22 0917       Lower Body Dressing Assessment/Training    [place] Level (Lower Body Dressing) verbal cues  -PG lower body dressing skills;independent  -PG    Row Name 12/02/22 0922 12/02/22 0917       Grooming Assessment/Training    [place] Level (Grooming) grooming skills;independent  -PG grooming skills;independent  -PG    Row Name 12/02/22 0922 12/02/22 0917       Toileting Assessment/Training    [place] Level (Toileting) toileting skills;independent  -PG toileting skills;independent  -PG          User Key  (r) = Recorded By, (t) = Taken By, (c) = Cosigned By    Initials Name Provider Type    PG Cabrera Dawson OT Occupational Therapist               Obj/Interventions     Row Name 12/02/22 0918          Sensory Assessment (Somatosensory)    Sensory Assessment (Somatosensory) sensation intact  -PG     Row Name 12/02/22 0918          Vision Assessment/Intervention    Visual Impairment/Limitations WFL  -PG     Row Name 12/02/22 0918          Range of Motion Comprehensive    General Range of Motion upper extremity range of motion deficits identified  -PG     Row Name 12/02/22 0918          Strength Comprehensive (MMT)    General Manual Muscle Testing (MMT) Assessment upper extremity strength deficits identified  -PG     Row Name 12/02/22 0922          Shoulder (Therapeutic Exercise)    Shoulder (Therapeutic Exercise) pendulum exercises  -PG     Shoulder Pendulum Exercises (Therapeutic Exercise) 30 seconds duration;right  -PG     Row Name 12/02/22 0922 12/02/22 0918       Motor Skills    Motor Skills -- coordination;functional endurance  -PG    Coordination -- WFL  -PG    Functional Endurance -- Good  -PG    Therapeutic  Exercise shoulder  -PG --          User Key  (r) = Recorded By, (t) = Taken By, (c) = Cosigned By    Initials Name Provider Type    PG Cabrera Dawson, OT Occupational Therapist               Goals/Plan    No documentation.                Clinical Impression     Row Name 12/02/22 0919          Pain Assessment    Pretreatment Pain Rating 7/10  -PG     Posttreatment Pain Rating 7/10  -PG     Pain Location - Side/Orientation Right  -PG     Pain Location - shoulder  -PG     Pain Intervention(s) Nursing Notified  -PG     Row Name 12/02/22 0919          Plan of Care Review    Plan of Care Reviewed With patient  -PG     Progress improving  -PG     Outcome Evaluation Patient has orders to discharge home today with outpatient physical therapy scheduled.  Patient reviewed Andrew dressing technique and home safety guidelines to maximize safety and independence when returning home  -PG     Row Name 12/02/22 0919          Therapy Assessment/Plan (OT)    Patient/Family Therapy Goal Statement (OT) Use arm without pain  -PG     Criteria for Skilled Therapeutic Interventions Met (OT) no;no problems identified which require skilled intervention  -PG     Therapy Frequency (OT) evaluation only  -PG     Row Name 12/02/22 0919          Therapy Plan Review/Discharge Plan (OT)    Anticipated Discharge Disposition (OT) home with outpatient therapy services  -PG           User Key  (r) = Recorded By, (t) = Taken By, (c) = Cosigned By    Initials Name Provider Type    PG Cabrera Dawson OT Occupational Therapist               Outcome Measures     Row Name 12/02/22 0920          How much help from another is currently needed...    Putting on and taking off regular lower body clothing? 3  -PG     Bathing (including washing, rinsing, and drying) 3  -PG     Toileting (which includes using toilet bed pan or urinal) 4  -PG     Putting on and taking off regular upper body clothing 4  -PG     Taking care of personal grooming (such as brushing teeth) 4   -PG     Eating meals 4  -PG     AM-PAC 6 Clicks Score (OT) 22  -PG     Row Name 12/02/22 0920          Functional Assessment    Outcome Measure Options AM-PAC 6 Clicks Daily Activity (OT);Optimal Instrument  -PG     Row Name 12/02/22 0920          Optimal Instrument    Optimal Instrument Optimal - 3  -PG     Bending/Stooping 1  -PG     Standing 1  -PG     Reaching 2  -PG     From the list, choose the 3 activities you would most like to be able to do without any difficulty Bending/stooping;Standing;Reaching  -PG     Total Score Optimal - 3 4  -PG           User Key  (r) = Recorded By, (t) = Taken By, (c) = Cosigned By    Initials Name Provider Type    PG Cabrera Dawson OT Occupational Therapist                Occupational Therapy Education     Title: PT OT SLP Therapies (Done)     Topic: Occupational Therapy (Done)     Point: ADL training (Done)     Description:   Instruct learner(s) on proper safety adaptation and remediation techniques during self care or transfers.   Instruct in proper use of assistive devices.              Learning Progress Summary           Patient Acceptance, E,D, DU by PG at 12/2/2022 0921                   Point: Home exercise program (Done)     Description:   Instruct learner(s) on appropriate technique for monitoring, assisting and/or progressing therapeutic exercises/activities.              Learning Progress Summary           Patient Acceptance, E,D, DU by PG at 12/2/2022 0921                   Point: Precautions (Done)     Description:   Instruct learner(s) on prescribed precautions during self-care and functional transfers.              Learning Progress Summary           Patient Acceptance, E,D, DU by PG at 12/2/2022 0921                   Point: Body mechanics (Done)     Description:   Instruct learner(s) on proper positioning and spine alignment during self-care, functional mobility activities and/or exercises.              Learning Progress Summary           Patient Acceptance, E,D,  DU by PG at 12/2/2022 0921                               User Key     Initials Effective Dates Name Provider Type Discipline    PG 06/16/21 -  Cabrera Dawson OT Occupational Therapist OT              OT Recommendation and Plan  Therapy Frequency (OT): evaluation only  Plan of Care Review  Plan of Care Reviewed With: patient  Progress: improving  Outcome Evaluation: Patient has orders to discharge home today with outpatient physical therapy scheduled.  Patient reviewed Andrew dressing technique and home safety guidelines to maximize safety and independence when returning home     Time Calculation:    Time Calculation- OT     Row Name 12/02/22 0923             Time Calculation- OT    OT Received On 12/02/22  -PG         Timed Charges    18096 - OT Therapeutic Exercise Minutes 10  -PG      46111 - OT Self Care/Mgmt Minutes 15  -PG         Untimed Charges    OT Eval/Re-eval Minutes 20  -PG         Total Minutes    Timed Charges Total Minutes 25  -PG      Untimed Charges Total Minutes 20  -PG       Total Minutes 45  -PG            User Key  (r) = Recorded By, (t) = Taken By, (c) = Cosigned By    Initials Name Provider Type    PG Cabrera Dawson OT Occupational Therapist              Therapy Charges for Today     Code Description Service Date Service Provider Modifiers Qty    02291028350 HC OT THER PROC EA 15 MIN 12/2/2022 Cabrera Dawson OT GO 1    92221554055 HC OT SELF CARE/MGMT/TRAIN EA 15 MIN 12/2/2022 Cabrera Dawson OT GO 1    37075986701 HC OT EVAL LOW COMPLEXITY 2 12/2/2022 Cabrera Dawson OT GO 1               Cabrera Dawson OT  12/2/2022

## 2022-12-02 NOTE — PROGRESS NOTES
[facility]     Progress Note    Patient Name: Harjinder Schneider  : 1960  MRN: [id number]  Primary Care Physician:  Giuliana Crowe APRN  Date of admission: 2022    Subjective   Subjective     HPI:  Pain well controlled.  Denies chest pain or shortness of breath.  Block is worn off, no residual numbness.  Up and ambulatory.  Voiding without difficulty.    Review of Systems   See HPI    Objective   Objective     Vitals:   Temp:  [97 °F (36.1 °C)-97.9 °F (36.6 °C)] 97.9 °F (36.6 °C)  Heart Rate:  [] 69  Resp:  [16-18] 17  BP: (111-154)/(66-97) 130/81  Flow (L/min):  [1-3] 1  Physical Exam    General: Alert, no acute distress   Right upper extremity: Aquacel dressing is clean and dry.  Sling in place.  Mild swelling.  Upper and lower arm compartments soft.  Sensation intact in the axillary, median, radial, ulnar nerve distributions.  Full motor function in the hand.  Palpable radial pulse.    Result Review      Hemoglobin   Date Value Ref Range Status   2022 13.3 13.0 - 17.7 g/dL Final     Hematocrit   Date Value Ref Range Status   2022 38.4 37.5 - 51.0 % Final        Result Review:  I have personally reviewed the results from the time of this admission to 2022 07:40 EST and agree with these findings:  [x]  Laboratory  []  Microbiology  [x]  Radiology  []  EKG/Telemetry   []  Cardiology/Vascular   []  Pathology  []  Old records  []  Other:      Assessment & Plan   Assessment / Plan     Brief Patient Summary:  Harjinder Schneider is a 62 y.o. male status post right reverse total shoulder arthroplasty on     Active Hospital Problems:  Active Hospital Problems    Diagnosis    • **Status post reverse arthroplasty of right shoulder    • Chronic HFrEF (heart failure with reduced ejection fraction) (HCC)    • Hypertension    • Rotator cuff arthropathy of both shoulders    • Arthritis of right shoulder region    • Chronic obstructive lung disease (HCC)      Plan:     Hemoglobin  13.3-monitor  X-rays reviewed: No fractures or hardware complications  Pain control  Postoperative antibiotics  DVT prophylaxis: Aspirin 325 mg daily, mobilize  PT/OT  Nonweightbearing right upper extremity in sling  Okay for pendulums, elbow range of motion exercises,  strengthening  No elevation greater than 90, external rotation greater than 10, internal rotation of the shoulder  Further care per primary team, appreciate assistance    Dispo: Anticipate discharge home when cleared per primary team and physical therapy.  2-week orthopedic follow-up for reevaluation.         DVT prophylaxis:  Mechanical DVT prophylaxis orders are present.    CODE STATUS:   Code Status (Patient has no pulse and is not breathing): CPR (Attempt to Resuscitate)  Medical Interventions (Patient has pulse or is breathing): Full Support  Release to patient: Routine Release      Electronically signed by Vadim Rooney MD, 12/02/22, 7:40 AM EST.

## 2022-12-14 ENCOUNTER — OFFICE VISIT (OUTPATIENT)
Dept: ORTHOPEDIC SURGERY | Facility: CLINIC | Age: 62
End: 2022-12-14

## 2022-12-14 VITALS — HEIGHT: 65 IN | BODY MASS INDEX: 27.99 KG/M2 | WEIGHT: 168 LBS

## 2022-12-14 DIAGNOSIS — Z96.611 S/P REVERSE TOTAL SHOULDER ARTHROPLASTY, RIGHT: Primary | ICD-10-CM

## 2022-12-14 DIAGNOSIS — M25.511 RIGHT SHOULDER PAIN, UNSPECIFIED CHRONICITY: ICD-10-CM

## 2022-12-14 PROCEDURE — 99024 POSTOP FOLLOW-UP VISIT: CPT | Performed by: PHYSICIAN ASSISTANT

## 2022-12-14 NOTE — PROGRESS NOTES
"Chief Complaint  Follow-up of the Right Shoulder    Subjective          Harjinder Schneider presents to [facility]   History of Present Illness    Harjinder Schneider presents today for a follow up of his right shoulder. Patient is 2 weeks postop right total reverse shoulder arthroplasty performed by Dr. Rooney on 2022.  Today, patient states he is doing well.  He reports minimal pain to his shoulder.  He explains that he has been doing his home exercises in his own physical therapy where he has been working on active shoulder range of motion exercises.  He states that he has not been wearing his sling the past several days.  He has not needed to take any pain medications.  He reports no new injuries.  Denies numbness or tingling.  Patient is currently on aspirin for DVT prophylaxis.  He denies fever or chills.  Denies complications, redness, or drainage from his incision site.        Allergies   Allergen Reactions   • Penicillins Unknown - Low Severity     CHILDHOOD ALLERGY UNKNOWN REACTION BY PT        Social History     Socioeconomic History   • Marital status:    Tobacco Use   • Smoking status: Former     Packs/day: 1.00     Types: Cigarettes     Quit date:      Years since quittin.9   • Smokeless tobacco: Never   Vaping Use   • Vaping Use: Never used   Substance and Sexual Activity   • Alcohol use: Yes     Comment: occasionally   • Drug use: Never   • Sexual activity: Defer        I reviewed the patient's chief complaint, history of present illness, review of systems, past medical history, surgical history, family history, social history, medications, and allergy list.     REVIEW OF SYSTEMS    Constitutional: Denies fevers, chills, weight loss  Cardiovascular: Denies chest pain, shortness of breath  Skin: Denies rashes, acute skin changes  Neurologic: Denies headache, loss of consciousness  MSK: Right shoulder pain.       Objective   Vital Signs:   Ht 165.1 cm (65\")   Wt " 76.2 kg (168 lb)   BMI 27.96 kg/m²     Body mass index is 27.96 kg/m².    Physical Exam    General: Alert, no acute distress  Right upper extremity:  Staples removed today without complications.  Incision is well-healing.  No active drainage or redness.  No concerning signs for infection.  Resolving bruising to the upper arm.  Full elbow range of motion.  Active wrist and finger range of motion.  Forearm soft.  Thumb opposition intact.  Palmar abduction of thumb intact.  Sensation intact axillary, median, radial, and ulnar nerve distributions.  Palpable radial pulse.    Procedures    Imaging Results (Most Recent)     Procedure Component Value Units Date/Time    XR Shoulder 2+ View Right [[id number]] Resulted: 12/14/22 1256     Updated: 12/14/22 1256    Narrative:      Indications: Follow-up right total reverse shoulder arthroplasty    Views: AP, Grashey, scapular Y right shoulder    Findings: Right total reverse shoulder arthroplasty implants in place.    Implant positioning is stable compared to immediate postoperative films.    No evidence of hardware loosening or complications.  Shoulder is reduced.    Comparative Data: Comparative data found and reviewed today.                     Assessment and Plan    Diagnoses and all orders for this visit:    1. S/P reverse total shoulder arthroplasty, right (Primary)  -     Ambulatory Referral to Physical Therapy Evaluate and treat, POST OP, Ortho    2. Right shoulder pain, unspecified chronicity  -     XR Shoulder 2+ View Right        Harjinder Schneider presents today 2 weeks postop right total reverse shoulder arthroplasty performed by Dr. Rooney on 12/1/2022.   X-rays reviewed with the patient today.  Staples removed today without complications.  Incision is well-healing.  No active redness or drainage noted. No concerning signs of infection. Incision site care was reviewed today. Patient instructed not to soak or submerge incision. Do not apply any lotions, creams, or  ointments to the incision at this time.  We discussed concerning signs and symptoms regarding the incision site.  Patient understood and agreed. Patient denies fever or chills. Patient will continue full time sling wear for 6 weeks total, removing it for hygiene and range of motion exercises.  Patient is instructed to begin formal physical therapy so that he is able to follow the total reverse arthroplasty rehabilitation protocol. Discussed importance of gentle passive range of motion exercises initially before transitioning to active range of motion as he progresses with formal physical therapy.  Patient instructed to continue with his gentle shoulder range of motion exercises as well as elbow, wrist, and finger range of motion.  Avoid lifting, pushing, pulling with the right upper extremity.  Patient expressed understanding.  Continue aspirin for DVT prophylaxis until 4 weeks postop.    Patient will follow up in 4 weeks for reevaluation.  We will obtain new x-rays of the right shoulder next visit.    Call or return if symptoms worsen or patient has any concerns.         Follow Up   Return in about 4 weeks (around 1/11/2023).  Patient was given instructions and counseling regarding his condition or for health maintenance advice. Please see specific information pulled into the AVS if appropriate.     Glendy Ha PA-C  12/14/22  15:55 EST

## 2022-12-28 DIAGNOSIS — M54.6 THORACIC BACK PAIN, UNSPECIFIED BACK PAIN LATERALITY, UNSPECIFIED CHRONICITY: ICD-10-CM

## 2022-12-28 NOTE — TELEPHONE ENCOUNTER
Caller: Harjinder Schneider    Relationship: Self    Best call back number: [phone]    Requested Prescriptions:   Requested Prescriptions     Pending Prescriptions Disp Refills   • diclofenac (VOLTAREN) 75 MG EC tablet 180 tablet 0     Sig: Take 1 tablet by mouth 2 (Two) Times a Day.   • cyclobenzaprine (FLEXERIL) 10 MG tablet 90 tablet 1     Sig: Take 1 tablet by mouth 3 (Three) Times a Day As Needed for Muscle Spasms.        Pharmacy where request should be sent: [address] [phone] [facility] [phone] FX     Does the patient have less than a 3 day supply:  [x] Yes  [] No    Would you like a call back once the refill request has been completed: [x] Yes [] No    If the office needs to give you a call back, can they leave a voicemail: [x] Yes [] No    Kathryn Scott Rep   12/28/22 12:31 EST

## 2022-12-29 RX ORDER — DICLOFENAC SODIUM 75 MG/1
75 TABLET, DELAYED RELEASE ORAL 2 TIMES DAILY
Qty: 180 TABLET | Refills: 0 | Status: SHIPPED | OUTPATIENT
Start: 2022-12-29 | End: 2023-04-06 | Stop reason: SDUPTHER

## 2022-12-29 RX ORDER — CYCLOBENZAPRINE HCL 10 MG
10 TABLET ORAL 3 TIMES DAILY PRN
Qty: 90 TABLET | Refills: 1 | Status: SHIPPED | OUTPATIENT
Start: 2022-12-29 | End: 2023-03-14 | Stop reason: SDUPTHER

## 2023-01-18 ENCOUNTER — OFFICE VISIT (OUTPATIENT)
Dept: ORTHOPEDIC SURGERY | Facility: CLINIC | Age: 63
End: 2023-01-18
Payer: MEDICARE

## 2023-01-18 VITALS — OXYGEN SATURATION: 97 % | BODY MASS INDEX: 29.32 KG/M2 | WEIGHT: 176 LBS | HEIGHT: 65 IN

## 2023-01-18 DIAGNOSIS — R23.8 SKIN IRRITATION: ICD-10-CM

## 2023-01-18 DIAGNOSIS — Z96.611 S/P REVERSE TOTAL SHOULDER ARTHROPLASTY, RIGHT: Primary | ICD-10-CM

## 2023-01-18 DIAGNOSIS — M25.511 RIGHT SHOULDER PAIN, UNSPECIFIED CHRONICITY: ICD-10-CM

## 2023-01-18 PROCEDURE — 99024 POSTOP FOLLOW-UP VISIT: CPT | Performed by: PHYSICIAN ASSISTANT

## 2023-01-18 RX ORDER — METHYLPREDNISOLONE 4 MG/1
TABLET ORAL
Qty: 21 TABLET | Refills: 0 | Status: SHIPPED | OUTPATIENT
Start: 2023-01-18 | End: 2023-03-01

## 2023-01-18 NOTE — PROGRESS NOTES
"Chief Complaint  Follow-up of the Right Shoulder    Subjective          Harjinder Schneider presents to [facility]   History of Present Illness    Harjinder Schneider presents today for a follow-up of his right shoulder.  Patient is 6 weeks postop right total reverse shoulder arthroplasty.  Today, patient states that his shoulder is doing well.  He has been attending formal physical therapy and reports improved range of motion.  He reports some soreness on occasion.  He states that his shoulder continues to get better all the time.  He states that his incision is well-healed with no concerns.  He has continue with his sling when outside of the home.  He denies numbness or tingling to the right upper extremity.  Patient describes a rash/welt that continues to appear in diffuse areas about his body.  He has been taking Benadryl to help alleviate the itching.  He reports describes severe itching.      Allergies   Allergen Reactions   • Penicillins Unknown - Low Severity     CHILDHOOD ALLERGY UNKNOWN REACTION BY PT        Social History     Socioeconomic History   • Marital status:    Tobacco Use   • Smoking status: Former     Packs/day: 1.00     Types: Cigarettes     Quit date: 2008     Years since quitting: 15.0   • Smokeless tobacco: Never   Vaping Use   • Vaping Use: Never used   Substance and Sexual Activity   • Alcohol use: Yes     Comment: occasionally   • Drug use: Never   • Sexual activity: Defer        I reviewed the patient's chief complaint, history of present illness, review of systems, past medical history, surgical history, family history, social history, medications, and allergy list.     REVIEW OF SYSTEMS    Constitutional: Denies fevers, chills, weight loss  Cardiovascular: Denies chest pain, shortness of breath  Skin: Denies rashes, acute skin changes  Neurologic: Denies headache, loss of consciousness  MSK: Right shoulder pain      Objective   Vital Signs:   Ht 165.1 cm (65\")   " Wt 79.8 kg (176 lb)   SpO2 97%   BMI 29.29 kg/m²     Body mass index is 29.29 kg/m².    Physical Exam    General: Alert. No acute distress.   Right upper extremity: Incision is well-healed.  No concerning signs for infection.  Nontender to palpation of the shoulder.  Active forward elevation 160 degrees.  External rotation to the lateral hip.  Full elbow range of motion.  Forearm soft.  Active wrist and finger range of motion.  Thumb opposition intact.  Palmar abduction of the thumb intact.  Palpable radial pulse.  Erythematous rash in the elbow crease with associated inflammation, nontender to palpation, no drainage.    Procedures    Imaging Results (Most Recent)     Procedure Component Value Units Date/Time    XR Scapula Right [[id number]] Resulted: 01/18/23 1032     Updated: 01/18/23 1035    Narrative:      Indications: Follow-up right total reverse shoulder arthroplasty    Views: AP, Grashey, scapular Y right shoulder    Findings: Right total reverse shoulder arthroplasty implants in place.    Implant positioning is stable.  No evidence of hardware loosening or   complications.  Glenohumeral joint is reduced.    Comparative Data: Comparative data found and reviewed today                   Assessment and Plan    Diagnoses and all orders for this visit:    1. S/P reverse total shoulder arthroplasty, right (Primary)    2. Right shoulder pain, unspecified chronicity  -     XR Scapula Right    3. Skin irritation  -     methylPREDNISolone (MEDROL) 4 MG dose pack; Use as directed by package instructions  Dispense: 21 tablet; Refill: 0        Harjinder Schneider presents today 6 weeks postop right total reverse shoulder arthroplasty.  X-rays were reviewed with the patient today.  Incision is well-healed with no concerning signs for infection.  Patient is instructed to continue with his home exercises as well as formal physical therapy.  Continue making progress with shoulder range of motion.  Continue to limit lifting,  pushing, or pulling at this time.  A Medrol Dosepak was prescribed today to alleviate skin irritation.  Patient instructed to follow-up with his primary care provider if his rash continues.      Patient will follow up in 6 weeks for reevaluation.  We will obtain new x-rays of the right shoulder at next visit.      Call or return if symptoms worsen or patient has any concerns.       Follow Up   Return in about 6 weeks (around 3/1/2023).  Patient was given instructions and counseling regarding his condition or for health maintenance advice. Please see specific information pulled into the AVS if appropriate.     Glendy Ha PA-C  01/18/23  12:24 EST

## 2023-03-01 ENCOUNTER — OFFICE VISIT (OUTPATIENT)
Dept: ORTHOPEDIC SURGERY | Facility: CLINIC | Age: 63
End: 2023-03-01
Payer: MEDICARE

## 2023-03-01 VITALS — WEIGHT: 176 LBS | HEIGHT: 65 IN | BODY MASS INDEX: 29.32 KG/M2

## 2023-03-01 DIAGNOSIS — Z96.611 S/P REVERSE TOTAL SHOULDER ARTHROPLASTY, RIGHT: Primary | ICD-10-CM

## 2023-03-01 DIAGNOSIS — M25.511 RIGHT SHOULDER PAIN, UNSPECIFIED CHRONICITY: ICD-10-CM

## 2023-03-01 PROCEDURE — 99024 POSTOP FOLLOW-UP VISIT: CPT | Performed by: PHYSICIAN ASSISTANT

## 2023-03-01 NOTE — PROGRESS NOTES
"Chief Complaint  Follow-up of the Right Shoulder    Subjective          Harjinder Schneider presents to [facility]   History of Present Illness    Harjinder Schneider presents today for a follow-up of his right shoulder.  Patient is 2 months postop right total reverse shoulder arthroplasty performed on 12/1/2023.  Today, patient states that he is doing well.  Patient has continue with physical therapy and his home exercises.  He reports minimal to no pain.  He reports good range of motion and strength.  He states that he will occasionally lift or pull something heavier without complications due to his shoulder feeling well.  Reports no new injuries.  He denies complications with his incision.  Patient states that he is happy with his progress.      Allergies   Allergen Reactions   • Penicillins Unknown - Low Severity     CHILDHOOD ALLERGY UNKNOWN REACTION BY PT        Social History     Socioeconomic History   • Marital status:    Tobacco Use   • Smoking status: Former     Packs/day: 1.00     Types: Cigarettes     Quit date: 2008     Years since quitting: 15.1   • Smokeless tobacco: Never   Vaping Use   • Vaping Use: Never used   Substance and Sexual Activity   • Alcohol use: Yes     Comment: occasionally   • Drug use: Never   • Sexual activity: Defer        I reviewed the patient's chief complaint, history of present illness, review of systems, past medical history, surgical history, family history, social history, medications, and allergy list.     REVIEW OF SYSTEMS    Constitutional: Denies fevers, chills, weight loss  Cardiovascular: Denies chest pain, shortness of breath  Skin: Denies rashes, acute skin changes  Neurologic: Denies headache, loss of consciousness  MSK: Right shoulder pain      Objective   Vital Signs:   Ht 165.1 cm (65\")   Wt 79.8 kg (176 lb)   BMI 29.29 kg/m²     Body mass index is 29.29 kg/m².    Physical Exam    General: Alert. No acute distress.   Right upper " extremity: Well-healed incision.  No concerning signs for infection.  Active forward elevation under 90 degrees.  External rotation 60 degrees.  Internal rotation to the back pocket.  No pain with shoulder range of motion.  Good strength to the shoulder.  Full elbow range of motion.  Forearm soft.  Active wrist and finger range of motion.  Thumb opposition intact.  Palmar abduction intact.  Sensation intact to axillary, median, radial, and ulnar nerve distributions.  Palpable radial pulse.    Procedures    Imaging Results (Most Recent)     Procedure Component Value Units Date/Time    XR Shoulder 2+ View Right [[id number]] Resulted: 03/01/23 1159     Updated: 03/01/23 1159    Narrative:      Indications: Follow-up right reverse total shoulder arthroplasty    Views: AP and scapular Y right shoulder    Findings: Reverse total shoulder arthroplasty implants in place.  Implant   positioning is stable without complications or loosening.  Shoulder is   reduced.  No periprosthetic fractures.    Comparative Data: Comparative data found and reviewed today.                    Assessment and Plan    Diagnoses and all orders for this visit:    1. S/P reverse total shoulder arthroplasty, right (Primary)    2. Right shoulder pain, unspecified chronicity  -     XR Shoulder 2+ View Right        Harjinder Schneider presents today status post right reverse total shoulder arthroplasty performed on 12/1/2022.  X-rays reviewed with the patient today.  Incision is well-healed with no concerning signs for infection.  Patient structured to continue with formal physical therapy as well as his home exercises.  Continue gradual returning to activities as tolerated.  We discussed antibiotic prophylaxis for dental procedures.  Patient expressed understanding.      Patient will follow up in 3 months for reevaluation.  We will obtain new x-rays of the right shoulder at next visit.      Call or return if symptoms worsen or patient has any concerns.        Follow Up   Return in about 3 months (around 6/1/2023).  Patient was given instructions and counseling regarding his condition or for health maintenance advice. Please see specific information pulled into the AVS if appropriate.     Glendy Ha PA-C  03/01/23  12:31 EST

## 2023-03-14 ENCOUNTER — TELEPHONE (OUTPATIENT)
Dept: FAMILY MEDICINE CLINIC | Facility: CLINIC | Age: 63
End: 2023-03-14

## 2023-03-14 DIAGNOSIS — M54.6 THORACIC BACK PAIN, UNSPECIFIED BACK PAIN LATERALITY, UNSPECIFIED CHRONICITY: ICD-10-CM

## 2023-03-14 RX ORDER — CYCLOBENZAPRINE HCL 10 MG
10 TABLET ORAL 3 TIMES DAILY PRN
Qty: 30 TABLET | Refills: 0 | Status: SHIPPED | OUTPATIENT
Start: 2023-03-14 | End: 2023-04-06 | Stop reason: SDUPTHER

## 2023-03-14 NOTE — TELEPHONE ENCOUNTER
Caller: Harjinder Schneider    Relationship: Self    Best call back number: [phone]    Requested Prescriptions:   Requested Prescriptions     Pending Prescriptions Disp Refills   • cyclobenzaprine (FLEXERIL) 10 MG tablet 90 tablet 1     Sig: Take 1 tablet by mouth 3 (Three) Times a Day As Needed for Muscle Spasms.        Pharmacy where request should be sent: [address] [phone] [facility] [phone] FX     Additional details provided by patient: PATIENT IS COMPLETELY OUT OF THIS MEDICATION. PLEASE SEND NEW SCRIPT WITH REFILLS TO PHARMACY ASAP TODAY.    Does the patient have less than a 3 day supply:  [x] Yes  [] No    Would you like a call back once the refill request has been completed: [] Yes [] No    If the office needs to give you a call back, can they leave a voicemail: [] Yes [] No    Kathryn Sinclair Rep   03/14/23 10:08 EDT

## 2023-04-06 ENCOUNTER — TELEPHONE (OUTPATIENT)
Dept: FAMILY MEDICINE CLINIC | Facility: CLINIC | Age: 63
End: 2023-04-06
Payer: MEDICARE

## 2023-04-06 DIAGNOSIS — I42.9 IDIOPATHIC CARDIOMYOPATHY: ICD-10-CM

## 2023-04-06 DIAGNOSIS — M54.6 THORACIC BACK PAIN, UNSPECIFIED BACK PAIN LATERALITY, UNSPECIFIED CHRONICITY: ICD-10-CM

## 2023-04-06 RX ORDER — LISINOPRIL 40 MG/1
40 TABLET ORAL DAILY
Qty: 90 TABLET | Refills: 0 | Status: SHIPPED | OUTPATIENT
Start: 2023-04-06 | End: 2023-04-10 | Stop reason: SDUPTHER

## 2023-04-06 RX ORDER — CYCLOBENZAPRINE HCL 10 MG
10 TABLET ORAL 3 TIMES DAILY PRN
Qty: 30 TABLET | Refills: 0 | Status: SHIPPED | OUTPATIENT
Start: 2023-04-06 | End: 2023-04-10 | Stop reason: SDUPTHER

## 2023-04-06 RX ORDER — CHLORPHENIRAMINE MALEATE 4 MG/1
8 TABLET ORAL DAILY
Status: CANCELLED | OUTPATIENT
Start: 2023-04-06

## 2023-04-06 RX ORDER — CARVEDILOL 12.5 MG/1
12.5 TABLET ORAL 2 TIMES DAILY
Qty: 180 TABLET | Refills: 0 | Status: SHIPPED | OUTPATIENT
Start: 2023-04-06 | End: 2023-04-10 | Stop reason: SDUPTHER

## 2023-04-06 RX ORDER — DICLOFENAC SODIUM 75 MG/1
75 TABLET, DELAYED RELEASE ORAL 2 TIMES DAILY
Qty: 180 TABLET | Refills: 0 | Status: SHIPPED | OUTPATIENT
Start: 2023-04-06 | End: 2023-04-10 | Stop reason: SDUPTHER

## 2023-04-06 NOTE — TELEPHONE ENCOUNTER
Caller: Harjinder Schneider    Relationship: Self    Best call back number:[phone]    Requested Prescriptions:   Requested Prescriptions     Pending Prescriptions Disp Refills   • carvedilol (COREG) 12.5 MG tablet 180 tablet 1     Sig: Take 1 tablet by mouth 2 (Two) Times a Day.   • lisinopril (PRINIVIL,ZESTRIL) 40 MG tablet 90 tablet 1     Sig: Take 1 tablet by mouth Daily.   • cyclobenzaprine (FLEXERIL) 10 MG tablet 30 tablet 0     Sig: Take 1 tablet by mouth 3 (Three) Times a Day As Needed for Muscle Spasms.   • diclofenac (VOLTAREN) 75 MG EC tablet 180 tablet 0     Sig: Take 1 tablet by mouth 2 (Two) Times a Day.        Pharmacy where request should be sent: [address] [phone] [facility] [phone] FX     Last office visit with prescribing clinician: 10/20/2022   Last telemedicine visit with prescribing clinician: Visit date not found   Next office visit with prescribing clinician: Visit date not found     Additional details provided by patient: PATIENT NEEDING MEDICATION SENT AS A 90 DAY SUPPLY    Does the patient have less than a 3 day supply:  [x] Yes  [] No    Would you like a call back once the refill request has been completed: [] Yes [x] No    If the office needs to give you a call back, can they leave a voicemail: [] Yes [x] No    Kathryn Dahl   04/06/23 10:29 EDT

## 2023-04-10 ENCOUNTER — OFFICE VISIT (OUTPATIENT)
Dept: FAMILY MEDICINE CLINIC | Facility: CLINIC | Age: 63
End: 2023-04-10
Payer: MEDICARE

## 2023-04-10 ENCOUNTER — TELEPHONE (OUTPATIENT)
Dept: FAMILY MEDICINE CLINIC | Facility: CLINIC | Age: 63
End: 2023-04-10

## 2023-04-10 VITALS
OXYGEN SATURATION: 98 % | HEART RATE: 76 BPM | SYSTOLIC BLOOD PRESSURE: 136 MMHG | TEMPERATURE: 97.5 F | WEIGHT: 176 LBS | DIASTOLIC BLOOD PRESSURE: 86 MMHG | BODY MASS INDEX: 29.29 KG/M2

## 2023-04-10 DIAGNOSIS — M54.6 THORACIC BACK PAIN, UNSPECIFIED BACK PAIN LATERALITY, UNSPECIFIED CHRONICITY: ICD-10-CM

## 2023-04-10 DIAGNOSIS — I42.9 IDIOPATHIC CARDIOMYOPATHY: Primary | ICD-10-CM

## 2023-04-10 DIAGNOSIS — E78.5 HYPERLIPIDEMIA, UNSPECIFIED HYPERLIPIDEMIA TYPE: ICD-10-CM

## 2023-04-10 PROCEDURE — 99214 OFFICE O/P EST MOD 30 MIN: CPT | Performed by: NURSE PRACTITIONER

## 2023-04-10 PROCEDURE — 1159F MED LIST DOCD IN RCRD: CPT | Performed by: NURSE PRACTITIONER

## 2023-04-10 PROCEDURE — 1160F RVW MEDS BY RX/DR IN RCRD: CPT | Performed by: NURSE PRACTITIONER

## 2023-04-10 PROCEDURE — 3079F DIAST BP 80-89 MM HG: CPT | Performed by: NURSE PRACTITIONER

## 2023-04-10 PROCEDURE — 3075F SYST BP GE 130 - 139MM HG: CPT | Performed by: NURSE PRACTITIONER

## 2023-04-10 RX ORDER — LISINOPRIL 40 MG/1
40 TABLET ORAL DAILY
Qty: 90 TABLET | Refills: 0 | Status: SHIPPED | OUTPATIENT
Start: 2023-04-10

## 2023-04-10 RX ORDER — CARVEDILOL 12.5 MG/1
12.5 TABLET ORAL 2 TIMES DAILY
Qty: 180 TABLET | Refills: 0 | Status: SHIPPED | OUTPATIENT
Start: 2023-04-10

## 2023-04-10 RX ORDER — DICLOFENAC SODIUM 75 MG/1
75 TABLET, DELAYED RELEASE ORAL 2 TIMES DAILY
Qty: 180 TABLET | Refills: 0 | Status: SHIPPED | OUTPATIENT
Start: 2023-04-10

## 2023-04-10 RX ORDER — CYCLOBENZAPRINE HCL 10 MG
10 TABLET ORAL 3 TIMES DAILY PRN
Qty: 270 TABLET | Refills: 0 | Status: SHIPPED | OUTPATIENT
Start: 2023-04-10 | End: 2023-07-09

## 2023-04-10 NOTE — TELEPHONE ENCOUNTER
Caller: Harjinder Schneider    Relationship to patient: Self    Best call back number: [phone]    Patient is needing: PATIENT STATES HE WAS SUPPOSED TO GET MEDICATION LISTED AS A 90 SUPPLY. PATIENT IS REQUESTING A CALL TO ADVISE WHEN THIS IS DONE AND TO BE SENT TO LISTED PHARMACY.    cyclobenzaprine (FLEXERIL) 10 MG tablet    [address] [phone] [facility] [phone] FX

## 2023-04-10 NOTE — PROGRESS NOTES
Chief Complaint  Hypertension (Refills)    PHQ-2 Total Score: 0    Subjective        Past Medical History:   Diagnosis Date   • Arthritis     RA   • Cardiomyopathy     [facility] CARDIOLOGIST, LOV 8/2022   • COPD (chronic obstructive pulmonary disease)     INHALER   • Hypertension    • Kidney damage     LEFT KIDNEY DAMAGE, NO ISSUES   • Renal anomaly     NONFUNCTIONING LEFT KIDNEY UNKNOWN CAUSE   • Scoliosis    • Shoulder pain     bilateral   • SOB (shortness of breath)     COPD, CARDIOMYOPATHY     Social History     Tobacco Use   • Smoking status: Former     Packs/day: 1.00     Years: 25.00     Pack years: 25.00     Types: Cigarettes     Quit date: 2008     Years since quitting: 15.2   • Smokeless tobacco: Never   Vaping Use   • Vaping Use: Never used   Substance Use Topics   • Alcohol use: Yes     Comment: occasionally   • Drug use: Never      Current Outpatient Medications on File Prior to Visit   Medication Sig   • albuterol sulfate  (90 Base) MCG/ACT inhaler Inhale 2 puffs Every 4 (Four) Hours As Needed for Wheezing.   • aspirin  MG tablet Take 1 tablet by mouth Daily. Indications: VTE Prophylaxis   • chlorpheniramine (CHLOR-TRIMETON) 4 MG tablet Take 2 tablets by mouth Daily.   • cyclobenzaprine (FLEXERIL) 10 MG tablet Take 1 tablet by mouth 3 (Three) Times a Day As Needed for Muscle Spasms.   • [DISCONTINUED] carvedilol (COREG) 12.5 MG tablet Take 1 tablet by mouth 2 (Two) Times a Day.   • [DISCONTINUED] diclofenac (VOLTAREN) 75 MG EC tablet Take 1 tablet by mouth 2 (Two) Times a Day.   • [DISCONTINUED] lisinopril (PRINIVIL,ZESTRIL) 40 MG tablet Take 1 tablet by mouth Daily.     No current facility-administered medications on file prior to visit.      Allergies   Allergen Reactions   • Penicillins Unknown - Low Severity     CHILDHOOD ALLERGY UNKNOWN REACTION BY PT      Health Maintenance Due   Topic Date Due   • COLORECTAL CANCER SCREENING  Never done   • Pneumococcal Vaccine 0-64 (1 - PCV) Never  done   • TDAP/TD VACCINES (1 - Tdap) Never done   • ZOSTER VACCINE (1 of 2) Never done   • ANNUAL WELLNESS VISIT  Never done      Harjinder Schneider presents to [facility]  History of Present Illness  Here to follow up on HTN.     HTN: pt does not monitor BP at home, normotensive in office. Pt states he has been very busy this morning.     Thoracic back pain stable with use of medication.    Patient states he is planning to move to [facility] within the next few weeks.  Will refill patient medications allow him 6 months to find a new provider in his new area.  Patient to follow-up for fasting labs.      Objective   Vital Signs:   /86   Pulse 76   Temp 97.5 °F (36.4 °C)   Wt 79.8 kg (176 lb)   SpO2 98%   BMI 29.29 kg/m²     Review of Systems   Respiratory: Negative for cough and shortness of breath.    Cardiovascular: Negative for chest pain and palpitations.      Physical Exam  Vitals reviewed.   Constitutional:       General: He is not in acute distress.     Appearance: Normal appearance. He is well-developed.   Eyes:      Conjunctiva/sclera: Conjunctivae normal.      Pupils: Pupils are equal, round, and reactive to light.   Cardiovascular:      Rate and Rhythm: Normal rate and regular rhythm.      Heart sounds: Normal heart sounds.   Pulmonary:      Effort: Pulmonary effort is normal.      Breath sounds: Normal breath sounds.   Musculoskeletal:      Cervical back: Neck supple.   Skin:     General: Skin is warm and dry.   Neurological:      Mental Status: He is alert and oriented to person, place, and time.   Psychiatric:         Mood and Affect: Mood and affect normal.         Behavior: Behavior normal.         Thought Content: Thought content normal.         Judgment: Judgment normal.        Result Review :   The following data was reviewed by: TROY Richard on 04/10/2023:  Common labs    Common Labs 7/19/22 11/23/22 11/23/22 11/23/22 12/2/22 12/2/22     [id number]  [id number]   Glucose   87   117 (A)   BUN   17   12   Creatinine   0.85   0.65 (A)   Sodium   138   137   Potassium   4.3   4.1   Chloride   102   104   Calcium   9.2   8.9   Albumin   4.20      Total Bilirubin   0.2      Alkaline Phosphatase   73      AST (SGOT)   15      ALT (SGPT)   14      WBC 10.31 11.40 (A)       Hemoglobin 14.4 14.8   13.3    Hematocrit 43.0 42.2   38.4    Platelets 221 229       Hemoglobin A1C    5.40     (A) Abnormal value                          Assessment and Plan    Diagnoses and all orders for this visit:    1. Idiopathic cardiomyopathy (Primary)  -     carvedilol (COREG) 12.5 MG tablet; Take 1 tablet by mouth 2 (Two) Times a Day.  Dispense: 180 tablet; Refill: 0  -     lisinopril (PRINIVIL,ZESTRIL) 40 MG tablet; Take 1 tablet by mouth Daily.  Dispense: 90 tablet; Refill: 0  -     CBC & Differential  -     Comprehensive Metabolic Panel  -     Urinalysis With Culture If Indicated -    2. Hyperlipidemia, unspecified hyperlipidemia type  -     Lipid Panel    3. Thoracic back pain, unspecified back pain laterality, unspecified chronicity  -     diclofenac (VOLTAREN) 75 MG EC tablet; Take 1 tablet by mouth 2 (Two) Times a Day.  Dispense: 180 tablet; Refill: 0        Follow Up   Return in about 6 months (around 10/10/2023) for Refills and fasting labs.  Patient was given instructions and counseling regarding his condition or for health maintenance advice. Please see specific information pulled into the AVS if appropriate.

## 2023-11-09 DIAGNOSIS — I42.9 IDIOPATHIC CARDIOMYOPATHY: ICD-10-CM

## 2023-11-09 RX ORDER — LISINOPRIL 40 MG/1
40 TABLET ORAL DAILY
Qty: 90 TABLET | Refills: 0 | OUTPATIENT
Start: 2023-11-09

## (undated) DEVICE — BIPOLAR SEALER 23-112-1 AQM 6.0: Brand: AQUAMANTYS™

## (undated) DEVICE — GLV SURG SENSICARE SLT PF LF 6 STRL

## (undated) DEVICE — SUT VIC 0 CT1 36IN J946H

## (undated) DEVICE — PROXIMATE RH ROTATING HEAD SKIN STAPLERS (35 WIDE) CONTAINS 35 STAINLESS STEEL STAPLES: Brand: PROXIMATE

## (undated) DEVICE — 450 ML BOTTLE OF 0.05% CHLORHEXIDINE GLUCONATE IN 99.95% STERILE WATER FOR IRRIGATION, USP AND APPLICATOR.: Brand: IRRISEPT ANTIMICROBIAL WOUND LAVAGE

## (undated) DEVICE — INTENDED FOR TISSUE SEPARATION, AND OTHER PROCEDURES THAT REQUIRE A SHARP SURGICAL BLADE TO PUNCTURE OR CUT.: Brand: BARD-PARKER ® CARBON RIB-BACK BLADES

## (undated) DEVICE — APPL CHLORAPREP HI/LITE 26ML ORNG

## (undated) DEVICE — STRYKER PERFORMANCE SERIES SAGITTAL BLADE: Brand: STRYKER PERFORMANCE SERIES

## (undated) DEVICE — STERILE POLYISOPRENE POWDER-FREE SURGICAL GLOVES WITH EMOLLIENT COATING: Brand: PROTEXIS

## (undated) DEVICE — SOL IRR NACL 0.9PCT BT 1000ML

## (undated) DEVICE — GLV SURG SENSICARE PI ORTHO SZ8 LF STRL

## (undated) DEVICE — TOTAL KNEE-LF: Brand: MEDLINE INDUSTRIES, INC.

## (undated) DEVICE — ARM DRAPE

## (undated) DEVICE — CANNULA SEAL

## (undated) DEVICE — 3 RING SUTURE PASSER - 16 CM: Brand: 3 RING SUTURE PASSER - 16 CM

## (undated) DEVICE — DAVINCI-LF: Brand: MEDLINE INDUSTRIES, INC.

## (undated) DEVICE — GLV SURG SENSICARE SLT PF LF 6.5 STRL

## (undated) DEVICE — SUT VIC PLS CTD BR 0 TIE 18IN VIL

## (undated) DEVICE — ENDOPATH PNEUMONEEDLE INSUFFLATION NEEDLES WITH LUER LOCK CONNECTORS 120MM: Brand: ENDOPATH

## (undated) DEVICE — MAT FLR ABS W/BLU/LINER 56X72IN WHT

## (undated) DEVICE — COVER,LIGHT HANDLE,FLX,1/PK: Brand: MEDLINE INDUSTRIES, INC.

## (undated) DEVICE — SUT MNCRYL PLS ANTIB UD 4/0 PS2 18IN

## (undated) DEVICE — DRSNG PAD ABD 8X10IN STRL

## (undated) DEVICE — 30977 SEE SHARP - ENHANCED INTRAOPERATIVE LAPAROSCOPE CLEANING & DEFOGGING: Brand: 30977 SEE SHARP - ENHANCED INTRAOPERATIVE LAPAROSCOPE CLEANING & DEFOGGING

## (undated) DEVICE — TOTAL TRAY, 16FR 10ML SIL FOLEY, URN: Brand: MEDLINE

## (undated) DEVICE — SOL IRR NACL 0.9PCT BT 250ML

## (undated) DEVICE — GLV SURG SENSICARE SLT PF LF 7.5 STRL

## (undated) DEVICE — TIP COVER ACCESSORY

## (undated) DEVICE — ENDOPATH XCEL WITH OPTIVIEW TECHNOLOGY BLADELESS TROCARS WITH STABILITY SLEEVES: Brand: ENDOPATH XCEL OPTIVIEW

## (undated) DEVICE — CVR LEG BOOTLEG F/R NOSKID 33IN

## (undated) DEVICE — Device

## (undated) DEVICE — SLV SCD KN/LEN ADJ EXPRSS BLENDED MD 1P/U

## (undated) DEVICE — ELASTIC SHOULDER IMMOBILIZER: Brand: DEROYAL

## (undated) DEVICE — ANTIBACTERIAL UNDYED BRAIDED (POLYGLACTIN 910), SYNTHETIC ABSORBABLE SUTURE: Brand: COATED VICRYL

## (undated) DEVICE — 40585 XL ADVANCED TRENDELENBURG POSITIONING KIT: Brand: 40585 XL ADVANCED TRENDELENBURG POSITIONING KIT

## (undated) DEVICE — DRSNG SURG AQUACEL AG/ADVNTGE 9X25CM 3.5X10IN

## (undated) DEVICE — GAUZE,SPONGE,4"X4",16PLY,STRL,LF,10/TRAY: Brand: MEDLINE

## (undated) DEVICE — SUT VIC 2/0 CT1 36IN

## (undated) DEVICE — PULLOVER TOGA, 2X LARGE: Brand: FLYTE, SURGICOOL

## (undated) DEVICE — 3M™ STERI-DRAPE™ U-DRAPE 1015: Brand: STERI-DRAPE™

## (undated) DEVICE — PENCL E/S SMOKEEVAC W/TELESCP CANN

## (undated) DEVICE — MEDI-VAC NON-CONDUCTIVE SUCTION TUBING: Brand: CARDINAL HEALTH

## (undated) DEVICE — DRAPE,SHOULDER,BEACH ULTRAGARD: Brand: MEDLINE

## (undated) DEVICE — DRSNG WND GZ CURAD OIL EMULSION 3X3IN STRL